# Patient Record
Sex: FEMALE | Race: ASIAN | Employment: OTHER | URBAN - METROPOLITAN AREA
[De-identification: names, ages, dates, MRNs, and addresses within clinical notes are randomized per-mention and may not be internally consistent; named-entity substitution may affect disease eponyms.]

---

## 2018-07-17 RX ORDER — SODIUM CHLORIDE, SODIUM LACTATE, POTASSIUM CHLORIDE, CALCIUM CHLORIDE 600; 310; 30; 20 MG/100ML; MG/100ML; MG/100ML; MG/100ML
INJECTION, SOLUTION INTRAVENOUS CONTINUOUS
Status: CANCELLED | OUTPATIENT
Start: 2018-07-17

## 2018-07-17 RX ORDER — CITRIC ACID/SODIUM CITRATE 334-500MG
30 SOLUTION, ORAL ORAL
Status: CANCELLED | OUTPATIENT
Start: 2018-07-17

## 2018-07-17 RX ORDER — CEFAZOLIN SODIUM 2 G/100ML
2 INJECTION, SOLUTION INTRAVENOUS
Status: CANCELLED | OUTPATIENT
Start: 2018-07-17

## 2018-07-17 RX ORDER — CEFAZOLIN SODIUM 1 G/3ML
1 INJECTION, POWDER, FOR SOLUTION INTRAMUSCULAR; INTRAVENOUS SEE ADMIN INSTRUCTIONS
Status: CANCELLED | OUTPATIENT
Start: 2018-07-17

## 2018-07-22 ENCOUNTER — ANESTHESIA EVENT (OUTPATIENT)
Dept: SURGERY | Facility: CLINIC | Age: 38
End: 2018-07-22
Payer: COMMERCIAL

## 2018-07-22 ENCOUNTER — HOSPITAL ENCOUNTER (INPATIENT)
Facility: CLINIC | Age: 38
LOS: 3 days | Discharge: HOME | End: 2018-07-25
Attending: OBSTETRICS & GYNECOLOGY | Admitting: OBSTETRICS & GYNECOLOGY
Payer: COMMERCIAL

## 2018-07-22 ENCOUNTER — ANESTHESIA (OUTPATIENT)
Dept: SURGERY | Facility: CLINIC | Age: 38
End: 2018-07-22
Payer: COMMERCIAL

## 2018-07-22 DIAGNOSIS — Z98.891 S/P REPEAT LOW TRANSVERSE C-SECTION: Primary | ICD-10-CM

## 2018-07-22 PROBLEM — Z36.89 ENCOUNTER FOR TRIAGE IN PREGNANT PATIENT: Status: ACTIVE | Noted: 2018-07-22

## 2018-07-22 LAB
ABO + RH BLD: NORMAL
ABO + RH BLD: NORMAL
BLD GP AB SCN SERPL QL: NORMAL
BLOOD BANK CMNT PATIENT-IMP: NORMAL
GLUCOSE BLDC GLUCOMTR-MCNC: 71 MG/DL (ref 70–99)
GLUCOSE BLDC GLUCOMTR-MCNC: 78 MG/DL (ref 70–99)
HGB BLD-MCNC: 12.4 G/DL (ref 11.7–15.7)
RUPTURE OF FETAL MEMBRANES BY ROM PLUS: POSITIVE
SPECIMEN EXP DATE BLD: NORMAL

## 2018-07-22 PROCEDURE — 84112 EVAL AMNIOTIC FLUID PROTEIN: CPT | Performed by: OBSTETRICS & GYNECOLOGY

## 2018-07-22 PROCEDURE — 27210794 ZZH OR GENERAL SUPPLY STERILE: Performed by: OBSTETRICS & GYNECOLOGY

## 2018-07-22 PROCEDURE — 00000146 ZZHCL STATISTIC GLUCOSE BY METER IP

## 2018-07-22 PROCEDURE — 86900 BLOOD TYPING SEROLOGIC ABO: CPT | Performed by: OBSTETRICS & GYNECOLOGY

## 2018-07-22 PROCEDURE — 86850 RBC ANTIBODY SCREEN: CPT | Performed by: OBSTETRICS & GYNECOLOGY

## 2018-07-22 PROCEDURE — 12000029 ZZH R&B OB INTERMEDIATE

## 2018-07-22 PROCEDURE — 37000009 ZZH ANESTHESIA TECHNICAL FEE, EACH ADDTL 15 MIN: Performed by: OBSTETRICS & GYNECOLOGY

## 2018-07-22 PROCEDURE — 25000128 H RX IP 250 OP 636: Performed by: NURSE ANESTHETIST, CERTIFIED REGISTERED

## 2018-07-22 PROCEDURE — 25000125 ZZHC RX 250: Performed by: NURSE ANESTHETIST, CERTIFIED REGISTERED

## 2018-07-22 PROCEDURE — 25000132 ZZH RX MED GY IP 250 OP 250 PS 637: Performed by: OBSTETRICS & GYNECOLOGY

## 2018-07-22 PROCEDURE — 37000008 ZZH ANESTHESIA TECHNICAL FEE, 1ST 30 MIN: Performed by: OBSTETRICS & GYNECOLOGY

## 2018-07-22 PROCEDURE — 25000128 H RX IP 250 OP 636: Performed by: OBSTETRICS & GYNECOLOGY

## 2018-07-22 PROCEDURE — 85018 HEMOGLOBIN: CPT | Performed by: OBSTETRICS & GYNECOLOGY

## 2018-07-22 PROCEDURE — 25000125 ZZHC RX 250: Performed by: OBSTETRICS & GYNECOLOGY

## 2018-07-22 PROCEDURE — 71000014 ZZH RECOVERY PHASE 1 LEVEL 2 FIRST HR: Performed by: OBSTETRICS & GYNECOLOGY

## 2018-07-22 PROCEDURE — 36000058 ZZH SURGERY LEVEL 3 EA 15 ADDTL MIN: Performed by: OBSTETRICS & GYNECOLOGY

## 2018-07-22 PROCEDURE — 27210995 ZZH RX 272: Performed by: OBSTETRICS & GYNECOLOGY

## 2018-07-22 PROCEDURE — 36415 COLL VENOUS BLD VENIPUNCTURE: CPT | Performed by: OBSTETRICS & GYNECOLOGY

## 2018-07-22 PROCEDURE — C1765 ADHESION BARRIER: HCPCS | Performed by: OBSTETRICS & GYNECOLOGY

## 2018-07-22 PROCEDURE — 86901 BLOOD TYPING SEROLOGIC RH(D): CPT | Performed by: OBSTETRICS & GYNECOLOGY

## 2018-07-22 PROCEDURE — 86780 TREPONEMA PALLIDUM: CPT | Performed by: OBSTETRICS & GYNECOLOGY

## 2018-07-22 PROCEDURE — 36000056 ZZH SURGERY LEVEL 3 1ST 30 MIN: Performed by: OBSTETRICS & GYNECOLOGY

## 2018-07-22 RX ORDER — SIMETHICONE 80 MG
80 TABLET,CHEWABLE ORAL 4 TIMES DAILY PRN
Status: DISCONTINUED | OUTPATIENT
Start: 2018-07-22 | End: 2018-07-25 | Stop reason: HOSPADM

## 2018-07-22 RX ORDER — OXYTOCIN/0.9 % SODIUM CHLORIDE 30/500 ML
340 PLASTIC BAG, INJECTION (ML) INTRAVENOUS CONTINUOUS PRN
Status: DISCONTINUED | OUTPATIENT
Start: 2018-07-22 | End: 2018-07-25 | Stop reason: HOSPADM

## 2018-07-22 RX ORDER — ACETAMINOPHEN 325 MG/1
650 TABLET ORAL EVERY 4 HOURS PRN
Status: DISCONTINUED | OUTPATIENT
Start: 2018-07-25 | End: 2018-07-25 | Stop reason: HOSPADM

## 2018-07-22 RX ORDER — NALOXONE HYDROCHLORIDE 0.4 MG/ML
.1-.4 INJECTION, SOLUTION INTRAMUSCULAR; INTRAVENOUS; SUBCUTANEOUS
Status: DISCONTINUED | OUTPATIENT
Start: 2018-07-22 | End: 2018-07-25 | Stop reason: HOSPADM

## 2018-07-22 RX ORDER — KETOROLAC TROMETHAMINE 30 MG/ML
30 INJECTION, SOLUTION INTRAMUSCULAR; INTRAVENOUS EVERY 6 HOURS
Status: DISPENSED | OUTPATIENT
Start: 2018-07-22 | End: 2018-07-23

## 2018-07-22 RX ORDER — SODIUM CHLORIDE, SODIUM LACTATE, POTASSIUM CHLORIDE, CALCIUM CHLORIDE 600; 310; 30; 20 MG/100ML; MG/100ML; MG/100ML; MG/100ML
INJECTION, SOLUTION INTRAVENOUS CONTINUOUS PRN
Status: DISCONTINUED | OUTPATIENT
Start: 2018-07-22 | End: 2018-07-22

## 2018-07-22 RX ORDER — IBUPROFEN 400 MG/1
400 TABLET, FILM COATED ORAL EVERY 6 HOURS PRN
Status: DISCONTINUED | OUTPATIENT
Start: 2018-07-23 | End: 2018-07-23

## 2018-07-22 RX ORDER — OXYTOCIN 10 [USP'U]/ML
10 INJECTION, SOLUTION INTRAMUSCULAR; INTRAVENOUS
Status: DISCONTINUED | OUTPATIENT
Start: 2018-07-22 | End: 2018-07-25 | Stop reason: HOSPADM

## 2018-07-22 RX ORDER — CITRIC ACID/SODIUM CITRATE 334-500MG
30 SOLUTION, ORAL ORAL
Status: COMPLETED | OUTPATIENT
Start: 2018-07-22 | End: 2018-07-22

## 2018-07-22 RX ORDER — CITRIC ACID/SODIUM CITRATE 334-500MG
SOLUTION, ORAL ORAL
Status: DISCONTINUED
Start: 2018-07-22 | End: 2018-07-22 | Stop reason: WASHOUT

## 2018-07-22 RX ORDER — SODIUM CHLORIDE, SODIUM LACTATE, POTASSIUM CHLORIDE, CALCIUM CHLORIDE 600; 310; 30; 20 MG/100ML; MG/100ML; MG/100ML; MG/100ML
INJECTION, SOLUTION INTRAVENOUS CONTINUOUS
Status: DISCONTINUED | OUTPATIENT
Start: 2018-07-22 | End: 2018-07-22 | Stop reason: HOSPADM

## 2018-07-22 RX ORDER — GLYCOPYRROLATE 0.2 MG/ML
INJECTION, SOLUTION INTRAMUSCULAR; INTRAVENOUS PRN
Status: DISCONTINUED | OUTPATIENT
Start: 2018-07-22 | End: 2018-07-22

## 2018-07-22 RX ORDER — OXYTOCIN/0.9 % SODIUM CHLORIDE 30/500 ML
100 PLASTIC BAG, INJECTION (ML) INTRAVENOUS CONTINUOUS
Status: DISCONTINUED | OUTPATIENT
Start: 2018-07-22 | End: 2018-07-25 | Stop reason: HOSPADM

## 2018-07-22 RX ORDER — KETOROLAC TROMETHAMINE 30 MG/ML
INJECTION, SOLUTION INTRAMUSCULAR; INTRAVENOUS PRN
Status: DISCONTINUED | OUTPATIENT
Start: 2018-07-22 | End: 2018-07-22

## 2018-07-22 RX ORDER — BUPIVACAINE HYDROCHLORIDE 5 MG/ML
INJECTION, SOLUTION EPIDURAL; INTRACAUDAL PRN
Status: DISCONTINUED | OUTPATIENT
Start: 2018-07-22 | End: 2018-07-22

## 2018-07-22 RX ORDER — CEFAZOLIN SODIUM 1 G/3ML
1 INJECTION, POWDER, FOR SOLUTION INTRAMUSCULAR; INTRAVENOUS SEE ADMIN INSTRUCTIONS
Status: DISCONTINUED | OUTPATIENT
Start: 2018-07-22 | End: 2018-07-22 | Stop reason: HOSPADM

## 2018-07-22 RX ORDER — AMOXICILLIN 250 MG
1 CAPSULE ORAL 2 TIMES DAILY PRN
Status: DISCONTINUED | OUTPATIENT
Start: 2018-07-22 | End: 2018-07-25 | Stop reason: HOSPADM

## 2018-07-22 RX ORDER — DIPHENHYDRAMINE HCL 25 MG
25 CAPSULE ORAL EVERY 6 HOURS PRN
Status: DISCONTINUED | OUTPATIENT
Start: 2018-07-22 | End: 2018-07-25 | Stop reason: HOSPADM

## 2018-07-22 RX ORDER — DEXTROSE, SODIUM CHLORIDE, SODIUM LACTATE, POTASSIUM CHLORIDE, AND CALCIUM CHLORIDE 5; .6; .31; .03; .02 G/100ML; G/100ML; G/100ML; G/100ML; G/100ML
INJECTION, SOLUTION INTRAVENOUS CONTINUOUS
Status: DISCONTINUED | OUTPATIENT
Start: 2018-07-22 | End: 2018-07-25 | Stop reason: HOSPADM

## 2018-07-22 RX ORDER — ONDANSETRON 2 MG/ML
INJECTION INTRAMUSCULAR; INTRAVENOUS PRN
Status: DISCONTINUED | OUTPATIENT
Start: 2018-07-22 | End: 2018-07-22

## 2018-07-22 RX ORDER — DEXTROSE MONOHYDRATE 25 G/50ML
25-50 INJECTION, SOLUTION INTRAVENOUS
Status: DISCONTINUED | OUTPATIENT
Start: 2018-07-22 | End: 2018-07-25 | Stop reason: HOSPADM

## 2018-07-22 RX ORDER — NALBUPHINE HYDROCHLORIDE 10 MG/ML
2.5-5 INJECTION, SOLUTION INTRAMUSCULAR; INTRAVENOUS; SUBCUTANEOUS EVERY 6 HOURS PRN
Status: DISCONTINUED | OUTPATIENT
Start: 2018-07-22 | End: 2018-07-22

## 2018-07-22 RX ORDER — AMOXICILLIN 250 MG
2 CAPSULE ORAL 2 TIMES DAILY PRN
Status: DISCONTINUED | OUTPATIENT
Start: 2018-07-22 | End: 2018-07-25 | Stop reason: HOSPADM

## 2018-07-22 RX ORDER — MORPHINE SULFATE 1 MG/ML
INJECTION, SOLUTION EPIDURAL; INTRATHECAL; INTRAVENOUS PRN
Status: DISCONTINUED | OUTPATIENT
Start: 2018-07-22 | End: 2018-07-22

## 2018-07-22 RX ORDER — OXYTOCIN/0.9 % SODIUM CHLORIDE 30/500 ML
PLASTIC BAG, INJECTION (ML) INTRAVENOUS PRN
Status: DISCONTINUED | OUTPATIENT
Start: 2018-07-22 | End: 2018-07-22

## 2018-07-22 RX ORDER — BISACODYL 10 MG
10 SUPPOSITORY, RECTAL RECTAL DAILY PRN
Status: DISCONTINUED | OUTPATIENT
Start: 2018-07-24 | End: 2018-07-25 | Stop reason: HOSPADM

## 2018-07-22 RX ORDER — HYDROCORTISONE 2.5 %
CREAM (GRAM) TOPICAL 3 TIMES DAILY PRN
Status: DISCONTINUED | OUTPATIENT
Start: 2018-07-22 | End: 2018-07-25 | Stop reason: HOSPADM

## 2018-07-22 RX ORDER — DEXAMETHASONE SODIUM PHOSPHATE 4 MG/ML
INJECTION, SOLUTION INTRA-ARTICULAR; INTRALESIONAL; INTRAMUSCULAR; INTRAVENOUS; SOFT TISSUE PRN
Status: DISCONTINUED | OUTPATIENT
Start: 2018-07-22 | End: 2018-07-22

## 2018-07-22 RX ORDER — CEFAZOLIN SODIUM 2 G/100ML
2 INJECTION, SOLUTION INTRAVENOUS
Status: DISCONTINUED | OUTPATIENT
Start: 2018-07-22 | End: 2018-07-22 | Stop reason: HOSPADM

## 2018-07-22 RX ORDER — CEFAZOLIN SODIUM 1 G/3ML
INJECTION, POWDER, FOR SOLUTION INTRAMUSCULAR; INTRAVENOUS PRN
Status: DISCONTINUED | OUTPATIENT
Start: 2018-07-22 | End: 2018-07-22

## 2018-07-22 RX ORDER — EPHEDRINE SULFATE 50 MG/ML
5 INJECTION, SOLUTION INTRAMUSCULAR; INTRAVENOUS; SUBCUTANEOUS
Status: DISCONTINUED | OUTPATIENT
Start: 2018-07-22 | End: 2018-07-22

## 2018-07-22 RX ORDER — IBUPROFEN 600 MG/1
600 TABLET, FILM COATED ORAL EVERY 6 HOURS PRN
Status: DISCONTINUED | OUTPATIENT
Start: 2018-07-23 | End: 2018-07-23

## 2018-07-22 RX ORDER — FENTANYL CITRATE 50 UG/ML
INJECTION, SOLUTION INTRAMUSCULAR; INTRAVENOUS PRN
Status: DISCONTINUED | OUTPATIENT
Start: 2018-07-22 | End: 2018-07-22

## 2018-07-22 RX ORDER — DIPHENHYDRAMINE HYDROCHLORIDE 50 MG/ML
25 INJECTION INTRAMUSCULAR; INTRAVENOUS EVERY 6 HOURS PRN
Status: DISCONTINUED | OUTPATIENT
Start: 2018-07-22 | End: 2018-07-25 | Stop reason: HOSPADM

## 2018-07-22 RX ORDER — IBUPROFEN 800 MG/1
800 TABLET, FILM COATED ORAL EVERY 6 HOURS PRN
Status: DISCONTINUED | OUTPATIENT
Start: 2018-07-23 | End: 2018-07-23

## 2018-07-22 RX ORDER — ONDANSETRON 2 MG/ML
4 INJECTION INTRAMUSCULAR; INTRAVENOUS EVERY 6 HOURS PRN
Status: DISCONTINUED | OUTPATIENT
Start: 2018-07-22 | End: 2018-07-22

## 2018-07-22 RX ORDER — NICOTINE POLACRILEX 4 MG
15-30 LOZENGE BUCCAL
Status: DISCONTINUED | OUTPATIENT
Start: 2018-07-22 | End: 2018-07-25 | Stop reason: HOSPADM

## 2018-07-22 RX ORDER — MAGNESIUM HYDROXIDE 1200 MG/15ML
LIQUID ORAL PRN
Status: DISCONTINUED | OUTPATIENT
Start: 2018-07-22 | End: 2018-07-25 | Stop reason: HOSPADM

## 2018-07-22 RX ORDER — OXYCODONE HYDROCHLORIDE 5 MG/1
5-10 TABLET ORAL
Status: DISCONTINUED | OUTPATIENT
Start: 2018-07-22 | End: 2018-07-25 | Stop reason: HOSPADM

## 2018-07-22 RX ORDER — LIDOCAINE 40 MG/G
CREAM TOPICAL
Status: DISCONTINUED | OUTPATIENT
Start: 2018-07-22 | End: 2018-07-25 | Stop reason: HOSPADM

## 2018-07-22 RX ORDER — PRENATAL VIT/IRON FUM/FOLIC AC 27MG-0.8MG
1 TABLET ORAL DAILY
COMMUNITY

## 2018-07-22 RX ORDER — ONDANSETRON 2 MG/ML
4 INJECTION INTRAMUSCULAR; INTRAVENOUS EVERY 6 HOURS PRN
Status: DISCONTINUED | OUTPATIENT
Start: 2018-07-22 | End: 2018-07-25 | Stop reason: HOSPADM

## 2018-07-22 RX ORDER — MISOPROSTOL 200 UG/1
400 TABLET ORAL
Status: DISCONTINUED | OUTPATIENT
Start: 2018-07-22 | End: 2018-07-25 | Stop reason: HOSPADM

## 2018-07-22 RX ORDER — ACETAMINOPHEN 325 MG/1
975 TABLET ORAL EVERY 8 HOURS
Status: DISCONTINUED | OUTPATIENT
Start: 2018-07-22 | End: 2018-07-25 | Stop reason: HOSPADM

## 2018-07-22 RX ORDER — LANOLIN 100 %
OINTMENT (GRAM) TOPICAL
Status: DISCONTINUED | OUTPATIENT
Start: 2018-07-22 | End: 2018-07-25 | Stop reason: HOSPADM

## 2018-07-22 RX ADMIN — SODIUM CITRATE AND CITRIC ACID MONOHYDRATE 30 ML: 500; 334 SOLUTION ORAL at 12:06

## 2018-07-22 RX ADMIN — PHENYLEPHRINE HYDROCHLORIDE 200 MCG: 10 INJECTION, SOLUTION INTRAMUSCULAR; INTRAVENOUS; SUBCUTANEOUS at 13:58

## 2018-07-22 RX ADMIN — MIDAZOLAM 2 MG: 1 INJECTION INTRAMUSCULAR; INTRAVENOUS at 13:54

## 2018-07-22 RX ADMIN — FENTANYL CITRATE 10 MCG: 50 INJECTION, SOLUTION INTRAMUSCULAR; INTRAVENOUS at 13:58

## 2018-07-22 RX ADMIN — ONDANSETRON 4 MG: 2 INJECTION INTRAMUSCULAR; INTRAVENOUS at 13:58

## 2018-07-22 RX ADMIN — SODIUM CHLORIDE, POTASSIUM CHLORIDE, SODIUM LACTATE AND CALCIUM CHLORIDE: 600; 310; 30; 20 INJECTION, SOLUTION INTRAVENOUS at 13:50

## 2018-07-22 RX ADMIN — BUPIVACAINE HYDROCHLORIDE 13.5 MG: 5 INJECTION, SOLUTION EPIDURAL; INTRACAUDAL at 13:58

## 2018-07-22 RX ADMIN — SODIUM CHLORIDE, POTASSIUM CHLORIDE, SODIUM LACTATE AND CALCIUM CHLORIDE: 600; 310; 30; 20 INJECTION, SOLUTION INTRAVENOUS at 12:32

## 2018-07-22 RX ADMIN — GLYCOPYRROLATE 0.2 MG: 0.2 INJECTION, SOLUTION INTRAMUSCULAR; INTRAVENOUS at 13:58

## 2018-07-22 RX ADMIN — SODIUM CHLORIDE, SODIUM LACTATE, POTASSIUM CHLORIDE, CALCIUM CHLORIDE AND DEXTROSE MONOHYDRATE: 5; 600; 310; 30; 20 INJECTION, SOLUTION INTRAVENOUS at 21:17

## 2018-07-22 RX ADMIN — SODIUM CHLORIDE, POTASSIUM CHLORIDE, SODIUM LACTATE AND CALCIUM CHLORIDE 1000 ML: 600; 310; 30; 20 INJECTION, SOLUTION INTRAVENOUS at 11:30

## 2018-07-22 RX ADMIN — MORPHINE SULFATE 0.15 MG: 1 INJECTION, SOLUTION EPIDURAL; INTRATHECAL; INTRAVENOUS at 13:58

## 2018-07-22 RX ADMIN — OXYTOCIN-SODIUM CHLORIDE 0.9% IV SOLN 30 UNIT/500ML 1 ML: 30-0.9/5 SOLUTION at 14:20

## 2018-07-22 RX ADMIN — ACETAMINOPHEN 975 MG: 325 TABLET, FILM COATED ORAL at 17:15

## 2018-07-22 RX ADMIN — OXYTOCIN-SODIUM CHLORIDE 0.9% IV SOLN 30 UNIT/500ML 199 ML: 30-0.9/5 SOLUTION at 14:32

## 2018-07-22 RX ADMIN — KETOROLAC TROMETHAMINE 30 MG: 30 INJECTION, SOLUTION INTRAMUSCULAR at 14:31

## 2018-07-22 RX ADMIN — CEFAZOLIN 2 G: 1 INJECTION, POWDER, FOR SOLUTION INTRAMUSCULAR; INTRAVENOUS at 13:50

## 2018-07-22 RX ADMIN — KETOROLAC TROMETHAMINE 30 MG: 30 INJECTION, SOLUTION INTRAMUSCULAR at 20:26

## 2018-07-22 RX ADMIN — OXYTOCIN-SODIUM CHLORIDE 0.9% IV SOLN 30 UNIT/500ML 100 ML/HR: 30-0.9/5 SOLUTION at 17:10

## 2018-07-22 RX ADMIN — PHENYLEPHRINE HYDROCHLORIDE 100 MCG: 10 INJECTION, SOLUTION INTRAMUSCULAR; INTRAVENOUS; SUBCUTANEOUS at 14:13

## 2018-07-22 RX ADMIN — DEXAMETHASONE SODIUM PHOSPHATE 8 MG: 4 INJECTION, SOLUTION INTRA-ARTICULAR; INTRALESIONAL; INTRAMUSCULAR; INTRAVENOUS; SOFT TISSUE at 13:58

## 2018-07-22 NOTE — IP AVS SNAPSHOT
MRN:[id number]                      After Visit Summary   7/22/2018    [facility] DARIO Frey    MRN: [id number]           Thank you!     Thank you for choosing [facility] for your care. Our goal is always to provide you with excellent care. Hearing back from our patients is one way we can continue to improve our services. Please take a few minutes to complete the written survey that you may receive in the mail after you visit. If you would like to speak to someone directly about your visit please contact Patient Relations at [phone]. Thank you!          Patient Information     Date Of Birth          1980        Designated Caregiver       Most Recent Value    Caregiver    Will someone help with your care after discharge? yes    Name of designated caregiver Jagrutidafreddy    Phone number of caregiver at bedside    Caregiver address same      About your hospital stay     You were admitted on:  July 22, 2018 You last received care in the:  [facility]    You were discharged on:  July 25, 2018        Reason for your hospital stay       Maternity care                  Who to Call     For medical emergencies, please call 911.  For non-urgent questions about your medical care, please call your primary care provider or clinic, [phone]  For questions related to your surgery, please call your surgery clinic        Attending Provider     Provider Specialty    Rusty Doty MD OB/Gyn       Primary Care Provider Office Phone # Fax #    [facility] [phone]      After Care Instructions     Activity       Review discharge instructions            Diet       Resume previous diet            Discharge Instructions - Postpartum visit       Schedule postpartum visit with your provider and return to clinic in 6 weeks.                  Follow-up Appointments     Follow Up and recommended labs and tests       Needs FBS at her 6 week exam                  Further  instructions from your care team       Postop  Birth Instructions  Lactation [phone]    Activity       Do not lift more than 10 pounds for 6 weeks after surgery.  Ask family and friends for help when you need it.    No driving until you have stopped taking your pain medications (usually two weeks after surgery).    No heavy exercise or activity for 6 weeks.  Don't do anything that will put a strain on your surgery site.    Don't strain when using the toilet.  Your care team may prescribe a stool softener if you have problems with your bowel movements.     To care for your incision:       Keep the incision clean and dry.    Do not soak your incision in water. No swimming or hot tubs until it has fully healed. You may soak in the bathtub if the water level is below your incision.    Do not use peroxide, gel, cream, lotion, or ointment on your incision.    Adjust your clothes to avoid pressure on your surgery site (check the elastic in your underwear for example).     You may see a small amount of clear or pink drainage and this is normal.  Check with your health care provider:       If the drainage increases or has an odor.    If the incision reddens, you have swelling, or develop a rash.    If you have increased pain and the medicine we prescribed doesn't help.    If you have a fever above 100.4 F (38 C) with or without chills when placing thermometer under your tongue.   The area around your incision (surgery wound), will feel numb.  This is normal. The numbness should go away in less than a year.     Keep your hands clean:  Always wash your hands before touching your incision (surgery wound). This helps reduce your risk of infection. If your hands aren't dirty, you may use an alcohol hand-rub to clean your hands. Keep your nails clean and short.    Call your healthcare provider if you have any of these symptoms:       You soak a sanitary pad with blood within 1 hour, or you see blood clots larger than  "a golf ball.    Bleeding that lasts more than 6 weeks.    Vaginal discharge that smells bad.    Severe pain, cramping or tenderness in your lower belly area.    A need to urinate more frequently (use the toilet more often), more urgently (use the toilet very quickly), or it burns when you urinate.    Nausea and vomiting.    Redness, swelling or pain around a vein in your leg.    Problems breastfeeding or a red or painful area on your breast.    Chest pain and cough or are gasping for air.    Problems with coping with sadness, anxiety or depression. If you have concerns about hurting yourself or the baby, call your provider immediately.      You have questions or concerns after you return home.       Pending Results     No orders found from 2018 to 2018.            Statement of Approval     Ordered          18 0753  I have reviewed and agree with all the recommendations and orders detailed in this document.  EFFECTIVE NOW     Approved and electronically signed by:  Ida Marroquin MD             Admission Information     Date & Time Provider Department Dept. Phone    2018 Rusty Doty MD [facility] Post[facility] [phone]      Your Vitals Were     Blood Pressure Pulse Temperature Respirations Pulse Oximetry       110/71 65 97.8  F (36.6  C) (Oral) 16 100%       [employer] Information     [employer] lets you send messages to your doctor, view your test results, renew your prescriptions, schedule appointments and more. To sign up, go to [email or web address] . Click on \"Log in\" on the left side of the screen, which will take you to the Welcome page. Then click on \"Sign up Now\" on the right side of the page.     You will be asked to enter the access code listed below, as well as some personal information. Please follow the directions to create your username and password.     Your access code is: [email or web address]  Expires: 10/23/2018 10:38 AM     Your access code will  in 90 days. If you need help " or a new code, please call your [place] clinic or [phone].        Care EveryWhere ID     This is your Care EveryWhere ID. This could be used by other organizations to access your [place] medical records  [id number]        Equal Access to Services     GUERLINE FERMIN : Hadii aad ku hadhibhanu Pascual, walydiada luqadaha, qatannerta kaalmada saadia, calli gabe rebasommer pelletierkamlesh sandhu gudelia jennings. So [facility] [phone].    ATENCIÓN: Si habla español, tiene a sanchez disposición servicios gratuitos de asistencia lingüística. Llame al [phone].    We comply with applicable federal civil rights laws and [place] laws. We do not discriminate on the basis of race, color, national origin, age, disability, sex, sexual orientation, or gender identity.               Review of your medicines      START taking        Dose / Directions    ibuprofen 600 MG tablet   Commonly known as:  ADVIL/MOTRIN        Dose:  600 mg   Take 1 tablet (600 mg) by mouth every 6 hours as needed for other (carmping)   Quantity:  120 tablet   Refills:  0       oxyCODONE IR 5 MG tablet   Commonly known as:  ROXICODONE        Dose:  5 mg   Take 1 tablet (5 mg) by mouth every 3 hours as needed for other (pain control or improvement in physical function. Hold dose for analgesic side effects.)   Quantity:  14 tablet   Refills:  0       senna-docusate 8.6-50 MG per tablet   Commonly known as:  SENOKOT-S;PERICOLACE        Dose:  1 tablet   Take 1 tablet by mouth 2 times daily as needed for constipation   Quantity:  100 tablet   Refills:  0         CONTINUE these medicines which may have CHANGED, or have new prescriptions. If we are uncertain of the size of tablets/capsules you have at home, strength may be listed as something that might have changed.        Dose / Directions    acetaminophen 325 MG tablet   Commonly known as:  TYLENOL   This may have changed:    - medication strength  - how much to take  - when to take this  - reasons to take this        Dose:  650  mg   Take 2 tablets (650 mg) by mouth every 6 hours as needed for other (multimodal surgical pain management along with NSAIDS and opioid medication as indicated based on pain control and physical function.)   Quantity:  100 tablet   Refills:  0         CONTINUE these medicines which have NOT CHANGED        Dose / Directions    NO ACTIVE MEDICATIONS        Refills:  0       prenatal multivitamin plus iron 27-0.8 MG Tabs per tablet        Dose:  1 tablet   Take 1 tablet by mouth daily   Refills:  0         STOP taking     azithromycin 250 MG tablet   Commonly known as:  ZITHROMAX           guaiFENesin-codeine 100-10 MG/5ML Soln solution   Commonly known as:  ROBITUSSIN AC                Where to get your medicines      These medications were sent to [facility] [place], [place] - [address] [address]  [address] [address] [address]     Phone:  [phone]     ibuprofen 600 MG tablet    senna-docusate 8.6-50 MG per tablet         Some of these will need a paper prescription and others can be bought over the counter. Ask your nurse if you have questions.     Bring a paper prescription for each of these medications     acetaminophen 325 MG tablet    oxyCODONE IR 5 MG tablet                Protect others around you: Learn how to safely use, store and throw away your medicines at www.disposemymeds.org.        Information about OPIOIDS     PRESCRIPTION OPIOIDS: WHAT YOU NEED TO KNOW   We gave you an opioid (narcotic) pain medicine. It is important to manage your pain, but opioids are not always the best choice. You should first try all the other options your care team gave you. Take this medicine for as short a time (and as few doses) as possible.     These medicines have risks:    DO NOT drive when on new or higher doses of pain medicine. These medicines can affect your alertness and reaction times, and you could be arrested for driving under the influence (DUI). If you need to use opioids  long-term, talk to your care team about driving.    DO NOT operate heave machinery    DO NOT do any other dangerous activities while taking these medicines.     DO NOT drink any alcohol while taking these medicines.      If the opioid prescribed includes acetaminophen, DO NOT take with any other medicines that contain acetaminophen. Read all labels carefully. Look for the word  acetaminophen  or  Tylenol.  Ask your pharmacist if you have questions or are unsure.    You can get addicted to pain medicines, especially if you have a history of addiction (chemical, alcohol or substance dependence). Talk to your care team about ways to reduce this risk.    Store your pills in a secure place, locked if possible. We will not replace any lost or stolen medicine. If you don t finish your medicine, please throw away (dispose) as directed by your pharmacist. The [place] Pollution Control Agency has more information about safe disposal: https://www.pca.[facility].mn.us/living-green/managing-unwanted-medications.     All opioids tend to cause constipation. Drink plenty of water and eat foods that have a lot of fiber, such as fruits, vegetables, prune juice, apple juice and high-fiber cereal. Take a laxative (Miralax, milk of magnesia, Colace, Senna) if you don t move your bowels at least every other day.              Medication List: This is a list of all your medications and when to take them. Check marks below indicate your daily home schedule. Keep this list as a reference.      Medications           Morning Afternoon Evening Bedtime As Needed    acetaminophen 325 MG tablet   Commonly known as:  TYLENOL   Take 2 tablets (650 mg) by mouth every 6 hours as needed for other (multimodal surgical pain management along with NSAIDS and opioid medication as indicated based on pain control and physical function.)   Last time this was given:  975 mg on 7/24/2018 12:06 PM                                ibuprofen 600 MG tablet   Commonly  known as:  ADVIL/MOTRIN   Take 1 tablet (600 mg) by mouth every 6 hours as needed for other (carmping)   Last time this was given:  800 mg on 7/25/2018  5:09 AM                                NO ACTIVE MEDICATIONS                                oxyCODONE IR 5 MG tablet   Commonly known as:  ROXICODONE   Take 1 tablet (5 mg) by mouth every 3 hours as needed for other (pain control or improvement in physical function. Hold dose for analgesic side effects.)                                prenatal multivitamin plus iron 27-0.8 MG Tabs per tablet   Take 1 tablet by mouth daily                                senna-docusate 8.6-50 MG per tablet   Commonly known as:  SENOKOT-S;PERICOLACE   Take 1 tablet by mouth 2 times daily as needed for constipation   Last time this was given:  2 tablets on 7/25/2018  8:02 AM

## 2018-07-22 NOTE — PLAN OF CARE
Data: [facility]: 2018 10:46 AM.  Reason for maternal/fetal assessment is leaking vaginal fluid. Patient reports SROM at 0700.  Patient is a .  Prenatal record reviewed. Pregnancy  has been complicated by gestational diabetes, diet controlled.  Gestational Age 36w2d. VSS. Fetal movement present. Patient denies uterine contractions, vaginal bleeding, abdominal pain, pelvic pressure, nausea, vomiting, headache, visual disturbances, epigastric or URQ pain, significant edema. Support person is present.   Action: Verbal consent for EFM. Triage assessment completed. Bill of rights reviewed.  Response: Patient verbalized agreement with plan. Will contact Dr Rusty Doty with update and further orders.

## 2018-07-22 NOTE — BRIEF OP NOTE
[facility] Brief Operative Note    Pre-operative diagnosis: Previous    Post-operative diagnosis Previous  sectio; SROM at 36 wks     Procedure: Procedure(s):  Repeat  Section     choice anesthesia - Wound Class: II-Clean Contaminated   Surgeon(s): Surgeon(s) and Role:     * Rusty Doty MD - Primary   Estimated blood loss: 500 mL    Specimens:   ID Type Source Tests Collected by Time Destination   1 :  Cord blood Blood OR DOCUMENTATION ONLY Rusty Doty MD 2018  2:19 PM       Findings: 5lb 7oz male infant with Apgars 8,9. Cleft lip observed

## 2018-07-22 NOTE — PLAN OF CARE
Problem: Patient Care Overview  Goal: Plan of Care/Patient Progress Review  Outcome: No Change  Oriented to Pp unit. Call light with in reach.  pt already started  breastpumping at this time. Plans to get pt out of bed when both legs are able . Mom x 1 emesis after drinking water. Educated to start with ice chips first. Denies any discomfort at this time.

## 2018-07-22 NOTE — IP AVS SNAPSHOT
[facility]    [address]    [facility] [address]    Phone:  [phone]    Fax:  [phone]                                       After Visit Summary   7/22/2018    [facility] ADRIO Frey    MRN: [id number]           After Visit Summary Signature Page     I have received my discharge instructions, and my questions have been answered. I have discussed any challenges I see with this plan with the nurse or doctor.    ..........................................................................................................................................  Patient/Patient Representative Signature      ..........................................................................................................................................  Patient Representative Print Name and Relationship to Patient    ..................................................               ................................................  Date                                            Time    ..........................................................................................................................................  Reviewed by Signature/Title    ...................................................              ..............................................  Date                                                            Time

## 2018-07-22 NOTE — ANESTHESIA POSTPROCEDURE EVALUATION
Patient: [facility] DARIO Frey    Procedure(s):  Repeat  Section     choice anesthesia - Wound Class: II-Clean Contaminated    Diagnosis:Previous   Diagnosis Additional Information: Repeat C/S in labor  Dr. DARIO Doty    Anesthesia Type:  Spinal    Note:  Anesthesia Post Evaluation    Patient location during evaluation: Bedside  Patient participation: Able to participate in evaluation but full recovery from regional anesthesia has not yet ocurrred but is anticipated to occur within 48 hours  Level of consciousness: awake  Pain management: adequate  Airway patency: patent  Cardiovascular status: acceptable  Respiratory status: acceptable  Hydration status: acceptable  PONV: none             Last vitals:  Vitals:    18 1100   BP: 127/75   Temp: 97.8  F (36.6  C)         Electronically Signed By: Manny Raymundo MD  2018  2:53 PM

## 2018-07-22 NOTE — PROVIDER NOTIFICATION
07/22/18 1230   Provider Notification   Provider Name/Title Dr Doty   Method of Notification At Bedside   Request Evaluate in Person   reviewing strip and aware of irreg heartbeat of fhts.

## 2018-07-22 NOTE — PROVIDER NOTIFICATION
18 1411   Provider Notification   Provider Name/Title    Method of Notification At Bedside   Request Attend Delivery

## 2018-07-22 NOTE — OP NOTE
Procedure Date: 2018      PROCEDURE PERFORMED:  Repeat low transverse  section.      PREOPERATIVE DIAGNOSES:   1.  Previous  x2.   2.  Spontaneous rupture of membranes at 36 weeks' gestation.   3.  Gestational diabetes, diet-controlled.      POSTOPERATIVE DIAGNOSIS:     1.  Previous  x2.   2.  Spontaneous rupture of membranes at 36 weeks' gestation.   3.  Gestational diabetes, diet-controlled.      PROCEDURE:  Rusty Doty MD      ANESTHESIA:  Spinal.      ESTIMATED BLOOD LOSS:  500 mL.      FINDINGS:  A 5 pound 7 ounce male infant with Apgars 8 and 9.  A cleft lip is observed.      COMPLICATIONS:  None.      INDICATIONS:  The patient is an otherwise healthy 37-year-old  3, para 2-0-0-2, female who has had an uncomplicated pregnancy, apart from gestational diabetes managed by diet alone.  She is known to be advanced maternal age, but declined genetic screening or testing or level 2 ultrasound.  She was scheduled for repeat  at 39 weeks, but presents at 36 weeks with spontaneous rupture of membranes.      DESCRIPTION OF PROCEDURE:  The patient was taken to the operating room where spinal anesthesia was placed and found to be adequate.  IV Ancef was administered.  Pneumatic compression devices were applied.  She was prepped and draped in the usual sterile fashion.      A Pfannenstiel incision was made with the scalpel blade.  Bovie cautery was used to dissect through subcutaneous tissue and fascia.  The fascia was elevated off the rectus muscle walls with blunt dissection and Bovie cautery.  Once in the peritoneal cavity, there were significant adhesions between the peritoneum and the anterior uterine wall.  These were carefully dissected away.  A bladder blade was placed inferiorly.  A low transverse cervical incision was made and entry into the amniotic cavity revealed clear fluid.  The fetus was found it was in vertex presentation and fundal pressure was applied to  allow for delivery.  Delayed cord clamping was performed.  The nursery staff was in attendance and the infant was handed to the NICU staff for inspection.      The placenta was delivered and was grossly unremarkable.  Good uterine tone was achieved with IV Pitocin.      The uterus was closed with a running stitch of 0 Monocryl suture.  A second imbricating layer was placed and hemostasis was confirmed.  Both tubes and ovaries were unremarkable.      A sheet of Interceed was placed along the site of the previous scarring to prevent new scarring.  The fascia was closed with a stitch of 0 PDS suture.  The subcutaneous tissues were irrigated and found to be hemostatic.  The skin was closed with Insorb staples.  A Tegaderm dressing was applied.  All sponge, lap, needle and instrument counts were correct and the patient was taken to the recovery room in stable condition.         IRA LEGGETT MD             D: 2018   T: 2018   MT: CHASE      Name:     KEYON LINDSEY [facility]   MRN:      [id number]        Account:        [id number]   :      1980           Procedure Date: 2018      Document: [id number]

## 2018-07-22 NOTE — H&P
36 yo  at 36+2 who presents with SROM. She has had 2 previous  sections.    PMH ILL GDM diet controlled   Surg C/S x 2   Meds PNV   All NKDA    OBHx As above  SHx    Non-smoker       PE Well appearing   AFVSS   Cardio RRR   Resp CTA   Ab Gravid nontender   Ext 0 edema   [place] None   FHT 130s with moderate variability; PACs audible    ROM plus positive    A/P 36 yo  at 36+2 with  AROM. Plan repeat  section

## 2018-07-22 NOTE — PROVIDER NOTIFICATION
07/22/18 1657   Provider Notification   Provider Name/Title Dr GRAEME Doty   Method of Notification Electronic Page;Phone   Request Evaluate-Remote   Notification Reason Other   Dr Doty updated re:pulse 43-55, BP wnl, pt asymptomatic fundus wnl, minimal bleeding. Will continue to monitor closely and update as needed.

## 2018-07-22 NOTE — ANESTHESIA PREPROCEDURE EVALUATION
PAC NOTE:       ANESTHESIA PRE EVALUATION:  Anesthesia Evaluation     . Pt has had prior anesthetic.     No history of anesthetic complications          ROS/MED HX    ENT/Pulmonary:  - neg pulmonary ROS     Neurologic:  - neg neurologic ROS     Cardiovascular:  - neg cardiovascular ROS       METS/Exercise Tolerance:     Hematologic:  - neg hematologic  ROS       Musculoskeletal:  - neg musculoskeletal ROS       GI/Hepatic:  - neg GI/hepatic ROS       Renal/Genitourinary:  - ROS Renal section negative       Endo:     (+) Obesity, .      Psychiatric:  - neg psychiatric ROS       Infectious Disease:  - neg infectious disease ROS       Malignancy:      - no malignancy   Other: Comment: At 36+ weeks with ROM and previous C/S for urgent C/S   (+) Possibly pregnant                    Physical Exam  Normal systems: cardiovascular, pulmonary and dental    Airway   Mallampati: II  TM distance: >3 FB  Neck ROM: full    Dental     Cardiovascular   Rhythm and rate: regular and normal      Pulmonary    breath sounds clear to auscultation    Other findings: No lab results found.   No lab results found.                  Anesthesia Plan      History & Physical Review  History and physical reviewed and following examination; no interval change.    ASA Status:  2 emergent.    NPO Status:  > 4 hours    Plan for Spinal   PONV prophylaxis:  Ondansetron (or other 5HT-3) and Dexamethasone or Solumedrol       Postoperative Care  Postoperative pain management:  IV analgesics, Oral pain medications, Neuraxial analgesia and Multi-modal analgesia.      Consents  Anesthetic plan, risks, benefits and alternatives discussed with:  Patient.  Use of blood products discussed: Yes.   Use of blood products discussed with Patient.  Consented to blood products.  .                            .

## 2018-07-22 NOTE — PLAN OF CARE
Data: [facility] DARIO Frey transferred to 444 via cart at 1745. Baby transferred to NICU after delivery.  Action: Receiving unit notified of transfer: Yes. Patient and family notified of room change. Report given to ISRAEL Joseph at 1815. Belongings sent to receiving unit. Accompanied by Registered Nurse. Oriented patient to surroundings. Call light within reach. ID bands double-checked with receiving RN.  Response: Patient tolerated transfer and is stable.

## 2018-07-22 NOTE — ANESTHESIA CARE TRANSFER NOTE
Patient: [facility] DARIO Frey    Procedure(s):  Repeat  Section     choice anesthesia - Wound Class: II-Clean Contaminated    Diagnosis: Previous   Diagnosis Additional Information: No value filed.    Anesthesia Type:   Spinal     Note:  Airway :Room Air  Patient transferred to:Labor and Delivery  Comments: VSS.Handoff Report: Identifed the Patient, Identified the Reponsible Provider, Reviewed the pertinent medical history, Discussed the surgical course, Reviewed Intra-OP anesthesia mangement and issues during anesthesia, Set expectations for post-procedure period and Allowed opportunity for questions and acknowledgement of understanding      Vitals: (Last set prior to Anesthesia Care Transfer)    CRNA VITALS  2018 1411 - 2018 1448      2018             Pulse: 79    SpO2: 98 %                Electronically Signed By: DARREN Corrigan CRNA  2018  2:48 PM

## 2018-07-22 NOTE — PROVIDER NOTIFICATION
07/22/18 9416   Comments   Comments bedside report given to tamica sanchez rn.  cares handed over.

## 2018-07-22 NOTE — ANESTHESIA PROCEDURE NOTES
Peripheral nerve/Neuraxial procedure note : intrathecal  Pre-Procedure  Performed by PROSPER COCHRAN  Location: OR      Pre-Anesthestic Checklist: patient identified, IV checked, site marked, risks and benefits discussed, informed consent, monitors and equipment checked, pre-op evaluation, at physician/surgeon's request and post-op pain management    Timeout  Correct Patient: Yes   Correct Procedure: Yes   Correct Site: Yes   Correct Laterality: Yes   Correct Position: Yes   Site Marked: Yes   .   Procedure Documentation    .    Procedure:    Intrathecal.  Insertion Site:L3-4  (midline approach)      Patient Prep;mask, sterile gloves, povidone-iodine 7.5% surgical scrub.  .  Needle: Sprotte Spinal Needle (gauge): 24  Spinal/LP Needle Length (inches): 4 # of attempts: 1 and # of redirects:  Introducer used (17 gTN with brad) .       Assessment/Narrative  Paresthesias: No.  .  .  clear CSF fluid removed . Sensory Level: T4

## 2018-07-22 NOTE — PROVIDER NOTIFICATION
07/22/18 1125   Provider Notification   Provider Name/Title Dr Doty   Method of Notification Electronic Page;Phone   Notification Reason Patient Arrived;Status Update

## 2018-07-23 LAB
GLUCOSE BLDC GLUCOMTR-MCNC: 107 MG/DL (ref 70–99)
HGB BLD-MCNC: 12.1 G/DL (ref 11.7–15.7)
T PALLIDUM AB SER QL: NONREACTIVE

## 2018-07-23 PROCEDURE — 00000146 ZZHCL STATISTIC GLUCOSE BY METER IP

## 2018-07-23 PROCEDURE — 25000128 H RX IP 250 OP 636: Performed by: OBSTETRICS & GYNECOLOGY

## 2018-07-23 PROCEDURE — 25000132 ZZH RX MED GY IP 250 OP 250 PS 637: Performed by: OBSTETRICS & GYNECOLOGY

## 2018-07-23 PROCEDURE — 36415 COLL VENOUS BLD VENIPUNCTURE: CPT | Performed by: OBSTETRICS & GYNECOLOGY

## 2018-07-23 PROCEDURE — 85018 HEMOGLOBIN: CPT | Performed by: OBSTETRICS & GYNECOLOGY

## 2018-07-23 PROCEDURE — 12000029 ZZH R&B OB INTERMEDIATE

## 2018-07-23 PROCEDURE — 40000083 ZZH STATISTIC IP LACTATION SERVICES 1-15 MIN

## 2018-07-23 RX ORDER — IBUPROFEN 600 MG/1
600 TABLET, FILM COATED ORAL EVERY 6 HOURS PRN
Status: DISCONTINUED | OUTPATIENT
Start: 2018-07-23 | End: 2018-07-25 | Stop reason: HOSPADM

## 2018-07-23 RX ORDER — IBUPROFEN 400 MG/1
400 TABLET, FILM COATED ORAL EVERY 6 HOURS PRN
Status: DISCONTINUED | OUTPATIENT
Start: 2018-07-23 | End: 2018-07-25 | Stop reason: HOSPADM

## 2018-07-23 RX ORDER — IBUPROFEN 800 MG/1
800 TABLET, FILM COATED ORAL EVERY 6 HOURS PRN
Status: DISCONTINUED | OUTPATIENT
Start: 2018-07-23 | End: 2018-07-25 | Stop reason: HOSPADM

## 2018-07-23 RX ADMIN — ACETAMINOPHEN 975 MG: 325 TABLET, FILM COATED ORAL at 02:44

## 2018-07-23 RX ADMIN — KETOROLAC TROMETHAMINE 30 MG: 30 INJECTION, SOLUTION INTRAMUSCULAR at 02:44

## 2018-07-23 RX ADMIN — IBUPROFEN 800 MG: 800 TABLET ORAL at 21:47

## 2018-07-23 RX ADMIN — IBUPROFEN 800 MG: 800 TABLET ORAL at 15:19

## 2018-07-23 RX ADMIN — ACETAMINOPHEN 975 MG: 325 TABLET, FILM COATED ORAL at 18:55

## 2018-07-23 RX ADMIN — KETOROLAC TROMETHAMINE 30 MG: 30 INJECTION, SOLUTION INTRAMUSCULAR at 08:19

## 2018-07-23 RX ADMIN — ACETAMINOPHEN 975 MG: 325 TABLET, FILM COATED ORAL at 10:22

## 2018-07-23 RX ADMIN — SODIUM CHLORIDE, SODIUM LACTATE, POTASSIUM CHLORIDE, CALCIUM CHLORIDE AND DEXTROSE MONOHYDRATE: 5; 600; 310; 30; 20 INJECTION, SOLUTION INTRAVENOUS at 05:24

## 2018-07-23 RX ADMIN — SENNOSIDES AND DOCUSATE SODIUM 1 TABLET: 8.6; 5 TABLET ORAL at 21:47

## 2018-07-23 RX ADMIN — SODIUM CHLORIDE, POTASSIUM CHLORIDE, SODIUM LACTATE AND CALCIUM CHLORIDE 500 ML: 600; 310; 30; 20 INJECTION, SOLUTION INTRAVENOUS at 04:22

## 2018-07-23 NOTE — PLAN OF CARE
Problem: Postpartum ( Delivery) (Adult,Obstetrics,Pediatric)  Goal: Signs and Symptoms of Listed Potential Problems Will be Absent, Minimized or Managed (Postpartum)  Signs and symptoms of listed potential problems will be absent, minimized or managed by discharge/transition of care (reference Postpartum ( Delivery) (Adult,Obstetrics,Pediatric) CPG).   Outcome: No Change  Pt was up few times at shift and went down with assist of 1 by w/c to see her infant at nicu, keeps pumping every 3 hrs, only few drops seen, encouraged to continue, removed cárdenas around 235 pm today, output is 900, yellow, improved after bolus from night shift, informed pt about expectations, planning on taking shower later today, wants to rest, reviewed pain plan with pt and spouse, iv is out, infiltrated.

## 2018-07-23 NOTE — PROGRESS NOTES
#1 Postop Repeat  36 weeks.    Pt states doing well.   Pain well controlled  Nursing.    Afebrile VSS. .  Fundus firm,light flow,  Incision dry & intact.  Passing flatus.  Urine clear.  Ext: w/o edema or pain    Normal postop course.    Continue routine postop care.

## 2018-07-23 NOTE — PLAN OF CARE
Problem: Patient Care Overview  Goal: Plan of Care/Patient Progress Review  Outcome: Improving  Pt. VSS, HR continues to run low. Pain managed with scheduled tylenol and toradol. Tegaderm dressing intact with small serosanguinous drainage, without signs of infection. Urine output low, Dr. Doty notified and fluid bolus ordered, see note. Will continue to monitor. Pt. tolerating oral intake of fluids well, denies nausea. Pt. requiring assistance with personal cares. Pumping for infant in NICU.

## 2018-07-23 NOTE — LACTATION NOTE
JULIANNE to see patient.  Her baby is in the NICU.  She has been pumping but no results yet.  JULIANNE reassured her that it is normal and explained the importance of frequent stimulation.  She has also been shown HE and will occasionally use HE as well.  LC discussed benefits of HE and breast massage, assessed pump settings, and will follow up tomorrow.

## 2018-07-23 NOTE — LACTATION NOTE
This note was copied from a baby's chart.  As LC spoke with [facility] in NICU. She is wanting to provide breast milk and declines use of donor milk. We discussed pumping strategies. At this time she has infrequently pumped (reports 2-3x yesterday). Defined need for 8X/ day. Reviewed settings as not to have suction too high. Encouraged by [place] team to put to breast. I anticipate due to anomaly of both lip and palate that will not be successful with transferring milk. I  will encourage STS for [facility]. Collaboration with OT and bedside RN.Will continue to follow and support.

## 2018-07-23 NOTE — PROVIDER NOTIFICATION
07/23/18 0348   Provider Notification   Provider Name/Title Dr. Doty   Method of Notification Phone   Request Evaluate-Remote   Notification Reason Status Update     Dr. Doty notified of low urine output, 23 mL/hour over last 6 hours. Patient tolerating oral intake well, IV fluids are infusing. Order received for 500 mL fluid bolus of LR to be infused over 1 hour.

## 2018-07-24 PROCEDURE — 25000132 ZZH RX MED GY IP 250 OP 250 PS 637: Performed by: OBSTETRICS & GYNECOLOGY

## 2018-07-24 PROCEDURE — 12000027 ZZH R&B OB

## 2018-07-24 RX ADMIN — ACETAMINOPHEN 975 MG: 325 TABLET, FILM COATED ORAL at 12:06

## 2018-07-24 RX ADMIN — ACETAMINOPHEN 975 MG: 325 TABLET, FILM COATED ORAL at 02:56

## 2018-07-24 RX ADMIN — SENNOSIDES AND DOCUSATE SODIUM 2 TABLET: 8.6; 5 TABLET ORAL at 23:25

## 2018-07-24 RX ADMIN — IBUPROFEN 800 MG: 800 TABLET ORAL at 17:01

## 2018-07-24 RX ADMIN — SENNOSIDES AND DOCUSATE SODIUM 1 TABLET: 8.6; 5 TABLET ORAL at 07:37

## 2018-07-24 RX ADMIN — IBUPROFEN 800 MG: 800 TABLET ORAL at 23:25

## 2018-07-24 ASSESSMENT — ACTIVITIES OF DAILY LIVING (ADL)
TRANSFERRING: 0-->INDEPENDENT
TOILETING: 0-->INDEPENDENT
BATHING: 0-->INDEPENDENT
FALL_HISTORY_WITHIN_LAST_SIX_MONTHS: NO
SWALLOWING: 0-->SWALLOWS FOODS/LIQUIDS WITHOUT DIFFICULTY
RETIRED_COMMUNICATION: 0-->UNDERSTANDS/COMMUNICATES WITHOUT DIFFICULTY
RETIRED_EATING: 0-->INDEPENDENT
COGNITION: 0 - NO COGNITION ISSUES REPORTED
DRESS: 0-->INDEPENDENT
AMBULATION: 0-->INDEPENDENT

## 2018-07-24 NOTE — PLAN OF CARE
Problem: Patient Care Overview  Goal: Plan of Care/Patient Progress Review  Patient up ad mango, taking wheelchair to NICU to see infant. Utilizing breast pump with encouragement. Incision well-approximated with tegaderm in place. Voiding without difficulty. Pain controlled with use of oral pain medications.

## 2018-07-24 NOTE — PROGRESS NOTES
July 24, 2018      SUBJECTIVE: No acute overnight events.  Pain adequately controlled.  +Lochia light.  Tolerating PO.  Flatus +, no BM yet.  Ambulating/urinating w/o difficulty.  Denies CP/palp/SOB/LH.    OBJECTIVE: /53  Pulse 65  Temp 98  F (36.7  C) (Oral)  Resp 18  SpO2 100%  Breastfeeding? Unknown  Gen: NAD, A&O x3  Abd: soft.  Incision C/D/I, no active bleeding.  No erythema, induration, or discharge, tegaderm on  Ext: mild edema BL LEs, symmetric, no CT    Hemoglobin   Date Value Ref Range Status   07/23/2018 12.1 11.7 - 15.7 g/dL Final   07/22/2018 12.4 11.7 - 15.7 g/dL Final   ]    A/P: POD#2 s/p rLTCS.  Doing well.  Afebrile, VSS.  - ibuprofen/oxycodone/tylenol PRN pain  - regular diet as tolerated  - breastfeeding  - encouraged ambulation  - routine post-op care        SONAL BURNETT MD

## 2018-07-24 NOTE — PLAN OF CARE
Problem: Patient Care Overview  Goal: Plan of Care/Patient Progress Review  Outcome: Improving  Stable patient, up independently and is voiding without difficulty. VSS, fundus firm, pain well managed with Tylenol and Ibuprofen. Pt encouraged to continue with pumping, she is also hand expressing. Incision site is dry and intact.  Spouse is present and supportive, both mom and dad are attentive to infant when visiting NICU, bonding well.

## 2018-07-24 NOTE — CONSULTS
Note copied from baby's chart  D) SW responding to MD consult. Met with baby Mendez Uribe's parents who reside in [place]. The couple now have 3 sons Hugo 15, Byron 9 and  Mendez Uribe who is in the NICU due to cleft palate and he was born at 36.2 weeks. The couple are prepared for him at home and are not on WIC. Ana has not completed her EPDS at this time but has no history of or concerns for baby blues/postpartum depression for her self. Ana's mother is living with them for 6 months from [facility] to help the family. MOB is a stay at home mom and FOB has 2 weeks off work and is also able to work from home.  I) SW explained role and provided supportive listening. SW gave information on baby blues/ postpartum depression as well as Community resources. SW also discussed NICU experience and gave NICU welcome card.  A)Ana is A&O with appropriate affect and eye contact her spouse is at bedside and supportive. They are both happy now that they have a plan for baby and feel he will be fine. The couple are feeling supported here and at home.  P) SW will continue to follow Mendez Uribe and his family while he is in the NICU.

## 2018-07-24 NOTE — PLAN OF CARE
Problem: Patient Care Overview  Goal: Plan of Care/Patient Progress Review  Outcome: Improving  Pt up ambulating independently. Voiding without difficulty. Incision approximated with Tegaderm dressing . Reports adequate pain control with current pain plan. Family present and supportive. Meeting expected goals. Mother attentive to infants needs visiting in NICU. Pumping and hand expression

## 2018-07-25 ENCOUNTER — LACTATION ENCOUNTER (OUTPATIENT)
Age: 38
End: 2018-07-25

## 2018-07-25 VITALS
SYSTOLIC BLOOD PRESSURE: 110 MMHG | DIASTOLIC BLOOD PRESSURE: 71 MMHG | HEART RATE: 65 BPM | TEMPERATURE: 97.8 F | RESPIRATION RATE: 16 BRPM | OXYGEN SATURATION: 100 %

## 2018-07-25 PROCEDURE — 40000083 ZZH STATISTIC IP LACTATION SERVICES 1-15 MIN

## 2018-07-25 PROCEDURE — 25000132 ZZH RX MED GY IP 250 OP 250 PS 637: Performed by: OBSTETRICS & GYNECOLOGY

## 2018-07-25 RX ORDER — ACETAMINOPHEN 325 MG/1
650 TABLET ORAL EVERY 6 HOURS PRN
Qty: 100 TABLET | Refills: 0 | Status: SHIPPED | OUTPATIENT
Start: 2018-07-25

## 2018-07-25 RX ORDER — AMOXICILLIN 250 MG
1 CAPSULE ORAL 2 TIMES DAILY PRN
Qty: 100 TABLET | Refills: 0 | Status: SHIPPED | OUTPATIENT
Start: 2018-07-25

## 2018-07-25 RX ORDER — OXYCODONE HYDROCHLORIDE 5 MG/1
5 TABLET ORAL
Qty: 14 TABLET | Refills: 0 | Status: SHIPPED | OUTPATIENT
Start: 2018-07-25

## 2018-07-25 RX ORDER — IBUPROFEN 600 MG/1
600 TABLET, FILM COATED ORAL EVERY 6 HOURS PRN
Qty: 120 TABLET | Refills: 0 | Status: SHIPPED | OUTPATIENT
Start: 2018-07-25

## 2018-07-25 RX ADMIN — SENNOSIDES AND DOCUSATE SODIUM 2 TABLET: 8.6; 5 TABLET ORAL at 08:02

## 2018-07-25 RX ADMIN — IBUPROFEN 800 MG: 800 TABLET ORAL at 05:09

## 2018-07-25 NOTE — DISCHARGE INSTRUCTIONS
Postop  Birth Instructions  Lactation [phone]    Activity       Do not lift more than 10 pounds for 6 weeks after surgery.  Ask family and friends for help when you need it.    No driving until you have stopped taking your pain medications (usually two weeks after surgery).    No heavy exercise or activity for 6 weeks.  Don't do anything that will put a strain on your surgery site.    Don't strain when using the toilet.  Your care team may prescribe a stool softener if you have problems with your bowel movements.     To care for your incision:       Keep the incision clean and dry.    Do not soak your incision in water. No swimming or hot tubs until it has fully healed. You may soak in the bathtub if the water level is below your incision.    Do not use peroxide, gel, cream, lotion, or ointment on your incision.    Adjust your clothes to avoid pressure on your surgery site (check the elastic in your underwear for example).     You may see a small amount of clear or pink drainage and this is normal.  Check with your health care provider:       If the drainage increases or has an odor.    If the incision reddens, you have swelling, or develop a rash.    If you have increased pain and the medicine we prescribed doesn't help.    If you have a fever above 100.4 F (38 C) with or without chills when placing thermometer under your tongue.   The area around your incision (surgery wound), will feel numb.  This is normal. The numbness should go away in less than a year.     Keep your hands clean:  Always wash your hands before touching your incision (surgery wound). This helps reduce your risk of infection. If your hands aren't dirty, you may use an alcohol hand-rub to clean your hands. Keep your nails clean and short.    Call your healthcare provider if you have any of these symptoms:       You soak a sanitary pad with blood within 1 hour, or you see blood clots larger than a golf ball.    Bleeding that lasts  more than 6 weeks.    Vaginal discharge that smells bad.    Severe pain, cramping or tenderness in your lower belly area.    A need to urinate more frequently (use the toilet more often), more urgently (use the toilet very quickly), or it burns when you urinate.    Nausea and vomiting.    Redness, swelling or pain around a vein in your leg.    Problems breastfeeding or a red or painful area on your breast.    Chest pain and cough or are gasping for air.    Problems with coping with sadness, anxiety or depression. If you have concerns about hurting yourself or the baby, call your provider immediately.      You have questions or concerns after you return home.

## 2018-07-25 NOTE — PROGRESS NOTES
Doing well. Is breast pumping. Baby is in NICU.    vss afeb  Abdomen soft and nt  Incision dry and intact with Tegaderm in place  Fundus firm and nt  Extremities nl    Hgb= 12.1  Glucose 71, 78, 107    A: POD 3 repeat LTCS doing well      Gestational diabetes diet controlled    P: discharge home       Precautions reviewed       RTC 6 will screen for diabetes at that visit

## 2018-07-25 NOTE — LACTATION NOTE
This note was copied from a baby's chart.  As LC assisting with breast feeding. Jojo is sleepy so placed STS to arouse then moved to breast. Attempt to latch but unable to suck and sustain latch. Introduced nipple shield with [facility]'s approval. Jojo latched and remained at breast with occasional sucking. Discussed strategies for him to be a breast even while NT feeding is infusing. Bedside ISRAEL Duval collaborated on plan. Will continue to follow and support.

## 2018-07-25 NOTE — LACTATION NOTE
This note was copied from a baby's chart.  As LC spoke with parents in the NICU. Reviewed ins co will not approve medical grade pump. Discussed option of renting medical grade pump as needed after milk is fully in. Reviewed pumping strategies, use and cleaning of the pump n style and provided brushes for cleaning.  [facility] and plans to go home. Plan to put babe to breast at 1pm. Will continue to follow and support.

## 2018-07-25 NOTE — PLAN OF CARE
Problem: Patient Care Overview  Goal: Plan of Care/Patient Progress Review  Outcome: Improving  VSS. Taking ibuprofen for discomfort. Encouraged patient to pump every 3 hours for infant in NICU. Incision WNL. Resting well tonight.

## 2018-07-27 ENCOUNTER — LACTATION ENCOUNTER (OUTPATIENT)
Age: 38
End: 2018-07-27

## 2018-07-27 NOTE — LACTATION NOTE
This note was copied from a baby's chart.  Assisting with breast feeding. Umar is alert and actively interested to latch at breast. Mother desires to attempt without nipple shield, but babe is frustrated. Introduced nipple shield. Improved hand placement for more breast to help seal at lip. Noted active sucking, more than this morning and occ swallow heard. Gtts of MOM given and encouraged active sucking. Encouraged breast compressions to aid in milk tranfer but difficult for Ana at this time. Continue to work on supporting babe and bringing him close to her during feeding rather than leaning over to feed. No milk transferred per scale. Ana brought in 60mL EBM. Will continue to follow and support.

## 2018-07-27 NOTE — LACTATION NOTE
This note was copied from a baby's chart.  As LC assisting with breast feeding. OT present and collaborating. Encouraged body support and hand positioning. Attempts to latch without shield not sustained. Difficult for Ana to support babe during breast feeding. Few initating sucks noted and then progressed to more appropriate sucking bursts. No milk was transferred per scale. Reviewed pumping strategies with Ana and stressed pumping every 3 hours. With OT, MD, myself and bedside RN we discussed breast feeding attempts ~3x/day for ~10-15 minutes followed by bottle feeding. Limiting time at breast to preserve his endurance for bottle feeding to get adequate nutrition. Parents acknowledged and agreed with this plan. Will continue to follow and support.

## 2018-07-29 NOTE — DISCHARGE SUMMARY
36 yo  who was admitted on2018 for SROM at 36+2 wks gestation. A repeat  was performed without complication. The infant was noted to have a cleft lip, which was unexpected    The patient's post op course was unremarkable. Her Hgb on post operative day 1 was 12.1g/dl. Her pain was manageable with oral medications. She had no signs of infection.    Due to feeding concerns, the infant was monitored in the NICU. The patient was discharged to home on post operative day 3.

## 2018-08-01 ENCOUNTER — LACTATION ENCOUNTER (OUTPATIENT)
Age: 38
End: 2018-08-01

## 2018-08-01 NOTE — LACTATION NOTE
This note was copied from a baby's chart.  As JULIANNE spoke with parents in the NICU. Spoke with parents in the NICU. Anticipating discharge tomorrow for Nate Joya is no longer offering the breast for fdg. We discussed her pumping strategies and recommendations were made for pumping schedule at home. We reviewed her pumping volume which is within target goals. I gave log sheets for monitoring volume as well as options for I phone apps. I gave her a binder to manage hands free pumping. I observed her pumping and changed breast shield to 21mm. After review of pump settings, I encouraged pumping until empty not by length of time. She will use a pump n style at home. I reviewed handouts for home storage of breast milk, thawing and warming milk, birth control, OTC and Rx medications. I gave them my card for further support in milk management after Jojo goes home.

## 2020-11-04 NOTE — PLAN OF CARE
[facility] Call Center    Phone Message    May a detailed message be left on voicemail: yes     Reason for Call: Other: Pt is requesting a sooner appt due to orginal appt being canceled due to scheduling error. Please call back regarding this     Action Taken: Other: [facility]    Travel Screening: Not Applicable                                                                       Problem: Patient Care Overview  Goal: Plan of Care/Patient Progress Review  Outcome: Adequate for Discharge Date Met: 07/25/18  Data: Vital signs within normal limits. Postpartum checks within normal limits - see flow record. Patient eating and drinking normally. Patient able to empty bladder independently and is up ambulating. No apparent signs of infection. Incision healing well. Patient performing self cares and is able to care for infant.  Action: Patient medicated during the shift for pain and cramping. See MAR. Patient reassessed within 1 hour after each medication and pain was improved - patient stated she was comfortable. Patient education done about discharge instructions, take home medications, post partum depression, and follow up appointments. Patient verbalized understanding of all discharge instructions and states she has no further questions/concerns.  Response: Visiting infant in NICU. Encouraged mother to continue pumping every 3 hours for infant. Support persons present.   Plan: Anticipate discharge home.

## 2020-12-14 ENCOUNTER — HEALTH MAINTENANCE LETTER (OUTPATIENT)
Age: 40
End: 2020-12-14

## 2021-10-02 ENCOUNTER — HEALTH MAINTENANCE LETTER (OUTPATIENT)
Age: 41
End: 2021-10-02

## 2022-01-11 NOTE — LACTATION NOTE
JULIANNE follow up.  She continues to pump.  She had many questions about how to obtain a pump for discharge.  JULIANNE also provided support and encouraged her to continue to place her infant STS frequently.  Ana may mimic breastfeeding if desired and approved by infant Pediatrician, but will continue to require pumping and supplementation due to infant cleft lip and palate.  All pumping questions answered.   Ftsg Text: The defect edges were debeveled with a #15 scalpel blade.  Given the location of the defect, shape of the defect and the proximity to free margins a full thickness skin graft was deemed most appropriate.  Using a sterile surgical marker, the primary defect shape was transferred to the donor site. The area thus outlined was incised deep to adipose tissue with a #15 scalpel blade.  The harvested graft was then trimmed of adipose tissue until only dermis and epidermis was left.  The skin margins of the secondary defect were undermined to an appropriate distance in all directions utilizing iris scissors.  The secondary defect was closed with interrupted buried subcutaneous sutures.  The skin edges were then re-apposed with running  sutures.  The skin graft was then placed in the primary defect and oriented appropriately.

## 2022-01-22 ENCOUNTER — HEALTH MAINTENANCE LETTER (OUTPATIENT)
Age: 42
End: 2022-01-22

## 2022-09-03 ENCOUNTER — HEALTH MAINTENANCE LETTER (OUTPATIENT)
Age: 42
End: 2022-09-03

## 2023-04-29 ENCOUNTER — HEALTH MAINTENANCE LETTER (OUTPATIENT)
Age: 43
End: 2023-04-29

## 2023-05-12 ENCOUNTER — OFFICE VISIT (OUTPATIENT)
Dept: FAMILY MEDICINE CLINIC | Facility: CLINIC | Age: 43
End: 2023-05-12
Payer: COMMERCIAL

## 2023-05-12 VITALS
RESPIRATION RATE: 18 BRPM | DIASTOLIC BLOOD PRESSURE: 82 MMHG | HEART RATE: 84 BPM | WEIGHT: 182 LBS | OXYGEN SATURATION: 98 % | BODY MASS INDEX: 32.65 KG/M2 | HEIGHT: 62.6 IN | TEMPERATURE: 98 F | SYSTOLIC BLOOD PRESSURE: 130 MMHG

## 2023-05-12 DIAGNOSIS — R05.3 CHRONIC COUGH: Primary | ICD-10-CM

## 2023-05-12 DIAGNOSIS — K80.20 CALCULUS OF GALLBLADDER WITHOUT CHOLECYSTITIS WITHOUT OBSTRUCTION: ICD-10-CM

## 2023-05-12 DIAGNOSIS — K21.9 GASTROESOPHAGEAL REFLUX DISEASE WITHOUT ESOPHAGITIS: ICD-10-CM

## 2023-05-12 PROCEDURE — 3008F BODY MASS INDEX DOCD: CPT | Performed by: FAMILY MEDICINE

## 2023-05-12 PROCEDURE — 3075F SYST BP GE 130 - 139MM HG: CPT | Performed by: FAMILY MEDICINE

## 2023-05-12 PROCEDURE — 3079F DIAST BP 80-89 MM HG: CPT | Performed by: FAMILY MEDICINE

## 2023-05-12 PROCEDURE — 99204 OFFICE O/P NEW MOD 45 MIN: CPT | Performed by: FAMILY MEDICINE

## 2023-08-16 ENCOUNTER — TELEPHONE (OUTPATIENT)
Dept: FAMILY MEDICINE CLINIC | Facility: CLINIC | Age: 43
End: 2023-08-16

## 2024-01-15 ENCOUNTER — PATIENT MESSAGE (OUTPATIENT)
Dept: FAMILY MEDICINE CLINIC | Facility: CLINIC | Age: 44
End: 2024-01-15

## 2024-01-15 NOTE — TELEPHONE ENCOUNTER
From: Angelica Okeefe  To: Codi Garcia  Sent: 1/15/2024 9:17 AM CST  Subject: Regarding appointment      Hi,   I am having physical appointment on Monday 22nd. I would like to take blood test before that. Can you send me message and details regarding that?   Thanks

## 2024-01-22 ENCOUNTER — OFFICE VISIT (OUTPATIENT)
Dept: FAMILY MEDICINE CLINIC | Facility: CLINIC | Age: 44
End: 2024-01-22
Payer: COMMERCIAL

## 2024-01-22 VITALS
SYSTOLIC BLOOD PRESSURE: 120 MMHG | RESPIRATION RATE: 18 BRPM | BODY MASS INDEX: 33.91 KG/M2 | WEIGHT: 189 LBS | TEMPERATURE: 97 F | DIASTOLIC BLOOD PRESSURE: 80 MMHG | OXYGEN SATURATION: 98 % | HEART RATE: 87 BPM | HEIGHT: 62.6 IN

## 2024-01-22 DIAGNOSIS — E55.9 VITAMIN D DEFICIENCY: ICD-10-CM

## 2024-01-22 DIAGNOSIS — E66.09 CLASS 1 OBESITY DUE TO EXCESS CALORIES WITHOUT SERIOUS COMORBIDITY WITH BODY MASS INDEX (BMI) OF 33.0 TO 33.9 IN ADULT: ICD-10-CM

## 2024-01-22 DIAGNOSIS — Z00.00 ROUTINE GENERAL MEDICAL EXAMINATION AT A HEALTH CARE FACILITY: Primary | ICD-10-CM

## 2024-01-22 DIAGNOSIS — Z12.4 SCREENING FOR CERVICAL CANCER: ICD-10-CM

## 2024-01-22 DIAGNOSIS — Z12.31 VISIT FOR SCREENING MAMMOGRAM: ICD-10-CM

## 2024-01-22 PROCEDURE — 88175 CYTOPATH C/V AUTO FLUID REDO: CPT | Performed by: FAMILY MEDICINE

## 2024-01-22 PROCEDURE — 87624 HPV HI-RISK TYP POOLED RSLT: CPT | Performed by: FAMILY MEDICINE

## 2024-01-22 NOTE — PROGRESS NOTES
[facility] Lilo Okeefe is a 43 year old female.  Chief Complaint   Patient presents with    Physical     HPI:   Angelica Okeefe is a 43 year old female with no significant past medical history seen for her annual physical and pap.  Menstrual cycle is regular and not heavy.  Does not do breast self exam, no family history of breast cancer, has never done a mammogram.    States eats more carbs, has not been exercising.    PAST MEDICAL HISTORY:   History reviewed. No pertinent past medical history.  PAST SURGICAL HISTORY:     Past Surgical History:   Procedure Laterality Date    HC  SECTION LEVEL I       ALLERGY:   Not on File  MEDICATIONS:     No current outpatient medications on file.     FAMILY HISTORY:   History reviewed. No pertinent family history.    SOCIAL HISTORY:     Social History     Socioeconomic History    Marital status:    Tobacco Use    Smoking status: Never    Smokeless tobacco: Never   Vaping Use    Vaping Use: Never used   Substance and Sexual Activity    Alcohol use: Never    Drug use: Never    Sexual activity: Never     REVIEW OF SYSTEMS:   Review of Systems   Constitutional:  Negative for appetite change, fatigue, fever and unexpected weight change.   HENT:  Negative for congestion, ear pain, hearing loss and sore throat.    Eyes:  Negative for discharge, redness and visual disturbance.   Respiratory:  Negative for cough, chest tightness and shortness of breath.    Cardiovascular:  Negative for chest pain and palpitations.   Gastrointestinal:  Negative for abdominal pain, blood in stool, constipation and nausea.   Endocrine: Negative for cold intolerance, heat intolerance and polyuria.   Genitourinary:  Negative for difficulty urinating, dysuria, frequency and urgency.   Musculoskeletal:  Negative for arthralgias, gait problem, joint swelling and myalgias.   Skin:  Negative for rash.   Allergic/Immunologic: Negative for food allergies.   Neurological:   Negative for dizziness, weakness, numbness and headaches.   Hematological:  Negative for adenopathy. Does not bruise/bleed easily.   Psychiatric/Behavioral:  Negative for dysphoric mood and sleep disturbance. The patient is not nervous/anxious.         PHYSICAL EXAM:   /80   Pulse 87   Temp 97 °F (36.1 °C) (Temporal)   Resp 18   Ht 5' 2.6\" (1.59 m)   Wt 189 lb (85.7 kg)   LMP 12/29/2023   SpO2 98%   BMI 33.91 kg/m²     Physical Exam  Constitutional:       General: She is not in acute distress.     Appearance: Normal appearance. She is well-developed. She is obese.   HENT:      Head: Normocephalic and atraumatic.      Right Ear: Tympanic membrane, ear canal and external ear normal.      Left Ear: Tympanic membrane, ear canal and external ear normal.      Nose: Nose normal.      Mouth/Throat:      Mouth: Mucous membranes are moist.      Pharynx: Oropharynx is clear.   Eyes:      Extraocular Movements: Extraocular movements intact.      Conjunctiva/sclera: Conjunctivae normal.      Pupils: Pupils are equal, round, and reactive to light.   Neck:      Thyroid: No thyromegaly.   Cardiovascular:      Rate and Rhythm: Normal rate and regular rhythm.      Heart sounds: Normal heart sounds. No murmur heard.  Pulmonary:      Effort: Pulmonary effort is normal. No respiratory distress.      Breath sounds: Normal breath sounds.   Chest:      Chest wall: No tenderness.   Breasts:     Right: Normal. No swelling, inverted nipple, mass, nipple discharge, skin change or tenderness.      Left: Normal. No swelling, inverted nipple, mass, nipple discharge, skin change or tenderness.   Abdominal:      General: Bowel sounds are normal. There is no distension.      Palpations: Abdomen is soft. There is no hepatomegaly, splenomegaly or mass.      Tenderness: There is no abdominal tenderness.      Hernia: No hernia is present. There is no hernia in the left inguinal area or right inguinal area.   Genitourinary:     General:  Normal vulva.      Exam position: Lithotomy position.      Labia:         Right: No rash or lesion.         Left: No rash or lesion.       Urethra: No urethral pain or urethral lesion.      Vagina: No vaginal discharge, erythema or lesions.      Cervix: No cervical motion tenderness, discharge, lesion or erythema.      Uterus: Not deviated, not enlarged, not fixed, not tender and no uterine prolapse.       Adnexa:         Right: No mass, tenderness or fullness.          Left: No mass, tenderness or fullness.        Rectum: Normal.   Musculoskeletal:         General: Normal range of motion.      Cervical back: Normal range of motion and neck supple.      Right lower leg: No edema.      Left lower leg: No edema.   Lymphadenopathy:      Cervical: No cervical adenopathy.      Upper Body:      Right upper body: No supraclavicular, axillary or pectoral adenopathy.      Left upper body: No supraclavicular, axillary or pectoral adenopathy.      Lower Body: No right inguinal adenopathy. No left inguinal adenopathy.   Skin:     General: Skin is warm.      Findings: No lesion or rash.   Neurological:      General: No focal deficit present.      Mental Status: She is alert and oriented to person, place, and time.      Deep Tendon Reflexes: Reflexes are normal and symmetric.   Psychiatric:         Mood and Affect: Mood normal.         Behavior: Behavior normal.         Thought Content: Thought content normal.         Judgment: Judgment normal.        ASSESSMENT AND PLAN:   Angelica was seen today for physical.    Diagnoses and all orders for this visit:    Routine general medical examination at a health care facility  -     CBC With Differential With Platelet  -     Assay, Thyroid Stim Hormone  -     Lipid Panel  -     Comp Metabolic Panel (14)    Visit for screening mammogram  -     3D Mammogram Digital Screen, Bilateral (CPT=77067/44714); Future    Screening for cervical cancer  -     ThinPrep PAP Smear; Future  -     Hpv Dna   High Risk , Thin Prep Collect; Future    Vitamin D deficiency  -     Vitamin D; Future    Class 1 obesity due to excess calories without serious comorbidity with body mass index (BMI) of 33.0 to 33.9 in adult         - encouraged healthy portion controlled diet and regular exercise.    Age appropriate anticipatory guidance reviewed  Health Maintenance   Topic Date Due    Annual Physical  Done today    Mammogram  Never done    COVID-19 Vaccine (1) Never done    Pap Smear  Done today    DTaP,Tdap,and Td Vaccines (1 - Tdap) 11/08/2018    Influenza Vaccine (1) 06/30/2024 (Originally 10/1/2023)    Annual Depression Screening  Completed    Pneumococcal Vaccine: Birth to 64yrs  Aged Out        The 21st Century Cures Act makes medical notes like these available to patients in the interest of transparency. Please be advised this is a medical document. Medical documents are intended to carry relevant information, facts as evident, and the clinical opinion of the practitioner. The medical note is intended as peer to peer communication and may appear blunt or direct. It is written in medical language and may contain abbreviations or verbiage that are unfamiliar.

## 2024-01-23 LAB — HPV I/H RISK 1 DNA SPEC QL NAA+PROBE: NEGATIVE

## 2024-01-25 LAB
.: NORMAL
.: NORMAL

## 2024-01-26 LAB
ABSOLUTE BASOPHILS: 28 CELLS/UL (ref 0–200)
ABSOLUTE EOSINOPHILS: 330 CELLS/UL (ref 15–500)
ABSOLUTE LYMPHOCYTES: 2129 CELLS/UL (ref 850–3900)
ABSOLUTE MONOCYTES: 402 CELLS/UL (ref 200–950)
ABSOLUTE NEUTROPHILS: 2613 CELLS/UL (ref 1500–7800)
ALBUMIN/GLOBULIN RATIO: 1.1 (CALC) (ref 1–2.5)
ALBUMIN: 4 G/DL (ref 3.6–5.1)
ALKALINE PHOSPHATASE: 120 U/L (ref 31–125)
ALT: 10 U/L (ref 6–29)
AST: 14 U/L (ref 10–30)
BASOPHILS: 0.5 %
BILIRUBIN, TOTAL: 0.6 MG/DL (ref 0.2–1.2)
BUN: 8 MG/DL (ref 7–25)
CALCIUM: 9.1 MG/DL (ref 8.6–10.2)
CARBON DIOXIDE: 24 MMOL/L (ref 20–32)
CHLORIDE: 99 MMOL/L (ref 98–110)
CHOL/HDLC RATIO: 5.6 (CALC)
CHOLESTEROL, TOTAL: 178 MG/DL
CREATININE: 0.66 MG/DL (ref 0.5–0.99)
EGFR: 112 ML/MIN/1.73M2
EOSINOPHILS: 6 %
GLOBULIN: 3.8 G/DL (CALC) (ref 1.9–3.7)
GLUCOSE: 81 MG/DL (ref 65–99)
HDL CHOLESTEROL: 32 MG/DL
HEMATOCRIT: 32.3 % (ref 35–45)
HEMOGLOBIN: 9.5 G/DL (ref 11.7–15.5)
LDL-CHOLESTEROL: 114 MG/DL (CALC)
LYMPHOCYTES: 38.7 %
MCH: 20.2 PG (ref 27–33)
MCHC: 29.4 G/DL (ref 32–36)
MCV: 68.6 FL (ref 80–100)
MONOCYTES: 7.3 %
MPV: 10.6 FL (ref 7.5–12.5)
NEUTROPHILS: 47.5 %
NON-HDL CHOLESTEROL: 146 MG/DL (CALC)
PLATELET COUNT: 401 THOUSAND/UL (ref 140–400)
POTASSIUM: 4.2 MMOL/L (ref 3.5–5.3)
PROTEIN, TOTAL: 7.8 G/DL (ref 6.1–8.1)
RDW: 15.7 % (ref 11–15)
RED BLOOD CELL COUNT: 4.71 MILLION/UL (ref 3.8–5.1)
SODIUM: 136 MMOL/L (ref 135–146)
TRIGLYCERIDES: 199 MG/DL
TSH: 14.46 MIU/L
VITAMIN D, 25-OH, TOTAL: 15 NG/ML (ref 30–100)
WHITE BLOOD CELL COUNT: 5.5 THOUSAND/UL (ref 3.8–10.8)

## 2024-02-07 ENCOUNTER — OFFICE VISIT (OUTPATIENT)
Dept: FAMILY MEDICINE CLINIC | Facility: CLINIC | Age: 44
End: 2024-02-07
Payer: COMMERCIAL

## 2024-02-07 VITALS
OXYGEN SATURATION: 98 % | HEIGHT: 62.6 IN | WEIGHT: 189 LBS | SYSTOLIC BLOOD PRESSURE: 118 MMHG | BODY MASS INDEX: 33.91 KG/M2 | DIASTOLIC BLOOD PRESSURE: 80 MMHG | HEART RATE: 78 BPM | TEMPERATURE: 98 F | RESPIRATION RATE: 18 BRPM

## 2024-02-07 DIAGNOSIS — J06.9 VIRAL UPPER RESPIRATORY TRACT INFECTION: ICD-10-CM

## 2024-02-07 DIAGNOSIS — E55.9 VITAMIN D DEFICIENCY: ICD-10-CM

## 2024-02-07 DIAGNOSIS — E78.2 MIXED HYPERLIPIDEMIA: ICD-10-CM

## 2024-02-07 DIAGNOSIS — R94.6 ABNORMAL THYROID FUNCTION TEST: Primary | ICD-10-CM

## 2024-02-07 DIAGNOSIS — D50.9 IRON DEFICIENCY ANEMIA, UNSPECIFIED IRON DEFICIENCY ANEMIA TYPE: ICD-10-CM

## 2024-02-07 PROCEDURE — 99213 OFFICE O/P EST LOW 20 MIN: CPT | Performed by: FAMILY MEDICINE

## 2024-02-07 RX ORDER — ERGOCALCIFEROL 1.25 MG/1
50000 CAPSULE ORAL WEEKLY
Qty: 12 CAPSULE | Refills: 0 | Status: SHIPPED | OUTPATIENT
Start: 2024-02-07 | End: 2024-02-07

## 2024-02-07 NOTE — PROGRESS NOTES
Angelica Okeefe is a 43 year old female.  Chief Complaint   Patient presents with    Lab Results     HPI:   Angelica Okeefe is a 43 year old female seen for follow up to discuss her lab results.    URI symptoms with runny nose and cough for the past 2 days, no fever, body aches or GI symptoms. Patient states every time she has a cold has cough and then gets wheezing and has to use her inhaler.    ALLERGY:   Not on File    MEDICATIONS:     No current outpatient medications on file.      History reviewed. No pertinent past medical history.   Social History:  Social History     Socioeconomic History    Marital status:    Tobacco Use    Smoking status: Never    Smokeless tobacco: Never   Vaping Use    Vaping Use: Never used   Substance and Sexual Activity    Alcohol use: Never    Drug use: Never    Sexual activity: Never        REVIEW OF SYSTEMS:   GENERAL HEALTH: feels well otherwise  SKIN: dryness  RESPIRATORY: denies shortness of breath with exertion  CARDIOVASCULAR: denies chest pain on exertion  GI: denies abdominal pain and denies heartburn  NEURO: denies headaches  GYN: normal menstrual flow, for 5 days    EXAM:   /80   Pulse 78   Temp 97.8 °F (36.6 °C) (Temporal)   Resp 18   Ht 5' 2.6\" (1.59 m)   Wt 189 lb (85.7 kg)   LMP 01/28/2024   SpO2 98%   BMI 33.91 kg/m²   GENERAL: obese,in no apparent distress  SKIN: xerosis  HEENT: atraumatic, normocephalic,nasal mucosa congested, ears and throat are clear  NECK: supple,no thyromegaly  LUNGS: clear to auscultation  CARDIO: RRR without murmur  EXTREMITIES: no cyanosis, clubbing or edema    Component      Latest Ref Rng 1/25/2024   WBC      3.8 - 10.8 Thousand/uL 5.5    RBC      3.80 - 5.10 Million/uL 4.71    Hemoglobin      11.7 - 15.5 g/dL 9.5 (L)    Hematocrit      35.0 - 45.0 % 32.3 (L)    MCV      80.0 - 100.0 fL 68.6 (L)    MCH      27.0 - 33.0 pg 20.2 (L)    MCHC      32.0 - 36.0 g/dL 29.4 (L)    RDW      11.0  - 15.0 % 15.7 (H)    Platelet Count      140 - 400 Thousand/uL 401 (H)    MPV      7.5 - 12.5 fL 10.6    Neutrophils Absolute      1,500 - 7,800 cells/uL 2,613    Lymphocytes Absolute      850 - 3,900 cells/uL 2,129    Monocytes Absolute      200 - 950 cells/uL 402    Eosinophils Absolute      15 - 500 cells/uL 330    Basophils Absolute      0 - 200 cells/uL 28    Neutrophils %      % 47.5    Lymphocytes %      % 38.7    Monocytes %      % 7.3    Eosinophils %      % 6.0    Basophils %      % 0.5    COMMENT(S) --    Glucose      65 - 99 mg/dL 81    BUN      7 - 25 mg/dL 8    CREATININE      0.50 - 0.99 mg/dL 0.66    EGFR      > OR = 60 mL/min/1.73m2 112    BUN/CREATININE RATIO      6 - 22 (calc) SEE NOTE:    Sodium      135 - 146 mmol/L 136    Potassium      3.5 - 5.3 mmol/L 4.2    Chloride      98 - 110 mmol/L 99    Carbon Dioxide, Total      20 - 32 mmol/L 24    CALCIUM      8.6 - 10.2 mg/dL 9.1    PROTEIN, TOTAL      6.1 - 8.1 g/dL 7.8    Albumin      3.6 - 5.1 g/dL 4.0    Globulin      1.9 - 3.7 g/dL (calc) 3.8 (H)    A/G Ratio      1.0 - 2.5 (calc) 1.1    Total Bilirubin      0.2 - 1.2 mg/dL 0.6    Alkaline Phosphatase      31 - 125 U/L 120    AST (SGOT)      10 - 30 U/L 14    ALT (SGPT)      6 - 29 U/L 10    Cholesterol, Total      <200 mg/dL 178    HDL Cholesterol      > OR = 50 mg/dL 32 (L)    Triglycerides      <150 mg/dL 199 (H)    LDL Cholesterol Calc      mg/dL (calc) 114 (H)    Chol/HDL Ratio      <5.0 (calc) 5.6 (H)    NON-HDL CHOLESTEROL      <130 mg/dL (calc) 146 (H)    TSH      mIU/L 14.46 (H)    VITAMIN D, 25-OH, TOTAL      30 - 100 ng/mL 15 (L)       Legend:  (L) Low  (H) High  ASSESSMENT AND PLAN:   [facility] was seen today for lab results.    Diagnoses and all orders for this visit:    Abnormal thyroid function test  -     Free T3 (Triiodothryronine); Future  -     Free T4, (Free Thyroxine); Future  -     Assay, Thyroid Stim Hormone; Future  -     Thyroid peroxidase & thyroglobulin ab; Future  -      Free T3 (Triiodothryronine)  -     Free T4, (Free Thyroxine)  -     Assay, Thyroid Stim Hormone  -     Thyroid peroxidase & thyroglobulin ab  -     recheck labs in 1 month and if abnormal will start medication    Mixed hyperlipidemia  -     LIPID PANEL [7600] [Q]; Future  -     LIPID PANEL [7600] [Q]  -     encouraged to limit saturated fats, cholesterol and refined sugars in diet  -     regular exercise    Vitamin D deficiency  -   patient will take over the counter vegetarian vitamin D tablets  -   advised 50,000IU once a week for 12 weeks and then 2000IU daily    Iron deficiency anemia, unspecified iron deficiency anemia type  -     CBC [6399] [Q]; Future  -     IRON AND TIBC [7573] [Q]; Future  -     FERRITIN [457] [Q]; Future  -     CBC [6399] [Q]  -     IRON AND TIBC [7573] [Q]  -     FERRITIN [457] [Q]  -     encouraged to increase iron rich foods in diet and recheck labs in 6 months    Viral upper respiratory tract infection        - comfort care discussed        -  increase fluid intake        - otc claritin, flonase nasal spray         The 21st Century Cures Act makes medical notes like these available to patients in the interest of transparency. Please be advised this is a medical document. Medical documents are intended to carry relevant information, facts as evident, and the clinical opinion of the practitioner. The medical note is intended as peer to peer communication and may appear blunt or direct. It is written in medical language and may contain abbreviations or verbiage that are unfamiliar.

## 2024-02-17 ENCOUNTER — HEALTH MAINTENANCE LETTER (OUTPATIENT)
Age: 44
End: 2024-02-17

## 2024-05-16 ENCOUNTER — TELEPHONE (OUTPATIENT)
Dept: INTERNAL MEDICINE CLINIC | Facility: CLINIC | Age: 44
End: 2024-05-16

## 2024-05-16 LAB
T3, FREE: 2.7 PG/ML (ref 2.3–4.2)
T4, FREE: 1 NG/DL (ref 0.8–1.8)
THYROGLOBULIN ANTIBODIES: <1 IU/ML
THYROID PEROXIDASE$ANTIBODIES: <1 IU/ML
TSH: 6.94 MIU/L

## 2024-05-16 NOTE — TELEPHONE ENCOUNTER
RN to pt call, pt accepted MD-offered appt.  Advised to arrive a few minutes early, pt and  verbalized understanding.    Future Appointments   Date Time Provider Department Center   5/22/2024  3:40 PM Cdoi Garcia MD [facility]

## 2024-05-16 NOTE — TELEPHONE ENCOUNTER
Called and spoke to pt. Pt was scheduling a f/u to sxs that have previously been discussed with Dr. Garcia. Per pt, she still has cough on/off, mostly occurs at night. Also will occasionally have \"stomach pains\", she stated both have previously been discussed, wanted to schedule f/u since still occurring intermittently.     No upcoming appts with Dr. Garcia. Would you see pt sooner? See partner?

## 2024-05-16 NOTE — TELEPHONE ENCOUNTER
Angelica Okeefe  P [facility] Office3 hours ago (10:49 AM)       Appointment For: Angelica Okeefe ([id number])  [facility]     7/9/2024    10:20 AM  40 mins.  Codi Garcia          [facility] [address]     Patient Comments:  Cough and stomach pain

## 2024-05-22 ENCOUNTER — OFFICE VISIT (OUTPATIENT)
Dept: FAMILY MEDICINE CLINIC | Facility: CLINIC | Age: 44
End: 2024-05-22

## 2024-05-22 VITALS
BODY MASS INDEX: 32.65 KG/M2 | RESPIRATION RATE: 18 BRPM | HEIGHT: 62.6 IN | DIASTOLIC BLOOD PRESSURE: 82 MMHG | HEART RATE: 86 BPM | OXYGEN SATURATION: 98 % | WEIGHT: 182 LBS | TEMPERATURE: 98 F | SYSTOLIC BLOOD PRESSURE: 120 MMHG

## 2024-05-22 DIAGNOSIS — R05.3 CHRONIC COUGH: Primary | ICD-10-CM

## 2024-05-22 DIAGNOSIS — R10.84 CHRONIC GENERALIZED ABDOMINAL PAIN: ICD-10-CM

## 2024-05-22 DIAGNOSIS — G89.29 CHRONIC GENERALIZED ABDOMINAL PAIN: ICD-10-CM

## 2024-05-22 DIAGNOSIS — E03.9 HYPOTHYROIDISM, UNSPECIFIED TYPE: ICD-10-CM

## 2024-05-22 PROCEDURE — 99214 OFFICE O/P EST MOD 30 MIN: CPT | Performed by: FAMILY MEDICINE

## 2024-05-22 RX ORDER — LEVOTHYROXINE SODIUM 0.03 MG/1
25 TABLET ORAL
Qty: 60 TABLET | Refills: 0 | Status: SHIPPED | OUTPATIENT
Start: 2024-05-22

## 2024-05-22 NOTE — PROGRESS NOTES
Angelica Okeefe is a 43 year old female.  Chief Complaint   Patient presents with    Abdominal Pain     HPI:   Angelica Okeefe is a 43 year old with history of chronic follow-up.  Patient is accompanied by .      Patient states abdominal pain started in 2019  started with cough, diagnosed has GERD and was given omeprazlzole 20 mg initially, patient states that did not help with her symptoms and the dose was increased to 40 mg, the cough better, states after she stopped the medication and sx came back.    Had ultrasound of gallbladder done for her abdominal pain and it did show a stone, referred to surgeon, states per surgeon was not an indication for surgery at the time.  Patient denies any right upper quadrant pain or any radiation of pain to the back, denies that her abdominal pain is associated open food intake.      Was referred to gastro as she continued pain, EGD done in 2019 was normal..    Patient denies any associated nausea, vomiting or diarrhea with no abdominal pain.  Denies being constipated.    Patient states sometimes using Flonase to help with the postnasal drainage, does get cough when she is exposed to cold air or dust, states using the inhaler at times does seem to help.    Would like to discuss her lab results    ALLERGY:   Not on File    MEDICATIONS:     No current outpatient medications on file.      History reviewed. No pertinent past medical history.   Social History:  Social History     Socioeconomic History    Marital status:    Tobacco Use    Smoking status: Never    Smokeless tobacco: Never   Vaping Use    Vaping status: Never Used   Substance and Sexual Activity    Alcohol use: Never    Drug use: Never    Sexual activity: Never        REVIEW OF SYSTEMS:   GENERAL HEALTH: feels well otherwise  SKIN: denies any unusual skin lesions or rashes  RESPIRATORY: denies shortness of breath with exertion  CARDIOVASCULAR: denies chest pain on  exertion  GI:  as documented in HPI  NEURO: denies headaches    EXAM:   /82   Pulse 86   Temp 98 °F (36.7 °C) (Temporal)   Resp 18   Ht 5' 2.6\" (1.59 m)   Wt 182 lb (82.6 kg)   LMP 05/17/2024   SpO2 98%   BMI 32.65 kg/m²   GENERAL: obese,in no apparent distress  HEENT: atraumatic, normocephalic, bilateral external canals and TMs appear normal, nasal mucosa is congested and swollen, no oropharyngeal erythema, +cobblestoning and postnasal drainage.  NECK: supple,no adenopathy  LUNGS: clear to auscultation, no wheezing, rhonchi or rales  CARDIO: RRR without murmur  ABDOMEN: soft, non tender, no HSM, normal BS.    ASSESSMENT AND PLAN:   Angelica was seen today for abdominal pain.    Diagnoses and all orders for this visit:    Chronic cough     - advised likely due to multiple reasons since in the past it did get better with omeprazole likely has GERD, advised if she has triggers like dust and cold could be allergic rhinitis and if she has not tried an antihistamine to do that daily along with the flonase nasal spray for 1 month. Can continue to use the inhaler as she might have reactive airway due to allergies.  If symptoms not     Chronic generalized abdominal pain  -     z [employer]ight CT ABDOMEN+PELVIS(CONTRAST ONLY)(CPT=74177); Future  -     z Insight CT ABDOMEN+PELVIS(CONTRAST ONLY)(CPT=74177)  -     H PYLORI BREATH TEST [89629][Q]  -     advised to get CT done and if imaging is normal and H.pylori is negative will refer to GI since her pain is not localized to the RUQ ( has cholelithiasis)  but is generalized    Hypothyroidism, unspecified type  -     levothyroxine 25 MCG Oral Tab; Take 1 tablet (25 mcg total) by mouth before breakfast.  -     TSH [899] [Q]  -     T4 FREE [866] [Q]  -     started on levothyroxine  -     advised to recheck labs in 6 weeks    Return if symptoms worsen or fail to improve.       The 21st Century Cures Act makes medical notes like these available to patients in the interest of  transparency. Please be advised this is a medical document. Medical documents are intended to carry relevant information, facts as evident, and the clinical opinion of the practitioner. The medical note is intended as peer to peer communication and may appear blunt or direct. It is written in medical language and may contain abbreviations or verbiage that are unfamiliar.

## 2024-05-22 NOTE — PATIENT INSTRUCTIONS
Please take over the counter claritin 10 mg daily and flonase nasal spray daily.  If cough is not better will refer to a pulmonologist.    If CT scan is normal and you still have abdominal pain will refer back to GI.

## 2025-04-07 ENCOUNTER — NURSE TRIAGE (OUTPATIENT)
Dept: FAMILY MEDICINE CLINIC | Facility: CLINIC | Age: 45
End: 2025-04-07

## 2025-04-07 NOTE — TELEPHONE ENCOUNTER
Action Requested: Summary for Provider     []  Critical Lab, Recommendations Needed  [] Need Additional Advice  [x]   FYI    []   Need Orders  [] Need Medications Sent to Pharmacy  []  Other     SUMMARY: ZULEYMA Lang Notified pt and spouse per protocol of OTC emergency contraceptive     Reason for call: No chief complaint on file.  Onset: Today   Reason for Disposition   Unprotected sexual intercourse within past 72 to 120 hours (3 to 5 days)    Protocols used: Contraception - Emergency-A-[place]    Received call from pt and spouse  Condom broke this AM and are looking for medication to avoid pregnancy  Pt not on any form of birth control   Notified per protocol, one time pill is available OTC and there is no need for prescription/order from doctor  Spouse and pt verbalize understanding

## (undated) DEVICE — CAP BABY PINK/BLUE IC-2

## (undated) DEVICE — PREP SKIN SCRUB TRAY 4461A

## (undated) DEVICE — SOL WATER IRRIG 1000ML BOTTLE 2F7114

## (undated) DEVICE — LINEN TOWEL PACK X10 5473

## (undated) DEVICE — SU PLAIN 0 FN-2 27" N864H

## (undated) DEVICE — BAG CLEAR TRASH 1.3M 39X33" P4040C

## (undated) DEVICE — SUCTION CANISTER MEDIVAC LINER 3000ML W/LID 65651-530

## (undated) DEVICE — LINEN FULL SHEET 5511

## (undated) DEVICE — DRSG TEGADERM 4X10" 1627

## (undated) DEVICE — GLOVE PROTEXIS POWDER FREE SMT 6.5  2D72PT65X

## (undated) DEVICE — LINEN DRAPE 54X72" 5467

## (undated) DEVICE — CATH TRAY FOLEY SURESTEP 16FR DRAIN BAG STATOCK A899916

## (undated) DEVICE — SOL NACL 0.9% IRRIG 1000ML BOTTLE 2F7124

## (undated) DEVICE — LINEN BABY BLANKET 5434

## (undated) DEVICE — GLOVE PROTEXIS BLUE W/NEU-THERA 6.5  2D73EB65

## (undated) DEVICE — STPL SKIN SUBCUTICULAR INSORB  2030

## (undated) DEVICE — PREP POVIDONE IODINE SOLUTION 10% 4OZ

## (undated) DEVICE — PREP CHLORAPREP 26ML TINTED ORANGE  260815

## (undated) DEVICE — GLOVE PROTEXIS MICRO 6.0  2D73PM60

## (undated) DEVICE — ESU GROUND PAD ADULT W/CORD E7507

## (undated) DEVICE — TRANSFER DEVICE BLOOD NDL HOLDER 364880

## (undated) DEVICE — PACK C-SECTION LF PL15OTA83B

## (undated) DEVICE — SU PDS II 0 CT 36" Z358T

## (undated) DEVICE — STOCKING SLEEVE VASOPRESS COMPRESSION CALF MED 18" VP501M

## (undated) DEVICE — SU MONOCRYL 0 CT-1 36" UND Y946H

## (undated) DEVICE — PAD CHUX UNDERPAD 30X36" P3036C

## (undated) DEVICE — BARRIER INTERCEED 3X4" 4350

## (undated) DEVICE — BLADE CLIPPER SGL USE 9680

## (undated) DEVICE — LINEN HALF SHEET 5512

## (undated) RX ORDER — FENTANYL CITRATE 50 UG/ML
INJECTION, SOLUTION INTRAMUSCULAR; INTRAVENOUS
Status: DISPENSED
Start: 2018-07-22

## (undated) RX ORDER — MORPHINE SULFATE 1 MG/ML
INJECTION, SOLUTION EPIDURAL; INTRATHECAL; INTRAVENOUS
Status: DISPENSED
Start: 2018-07-22

## (undated) RX ORDER — PHENYLEPHRINE HCL IN 0.9% NACL 1 MG/10 ML
SYRINGE (ML) INTRAVENOUS
Status: DISPENSED
Start: 2018-07-22

## (undated) RX ORDER — KETOROLAC TROMETHAMINE 30 MG/ML
INJECTION, SOLUTION INTRAMUSCULAR; INTRAVENOUS
Status: DISPENSED
Start: 2018-07-22

## (undated) RX ORDER — ONDANSETRON 2 MG/ML
INJECTION INTRAMUSCULAR; INTRAVENOUS
Status: DISPENSED
Start: 2018-07-22

## (undated) RX ORDER — DEXAMETHASONE SODIUM PHOSPHATE 4 MG/ML
INJECTION, SOLUTION INTRA-ARTICULAR; INTRALESIONAL; INTRAMUSCULAR; INTRAVENOUS; SOFT TISSUE
Status: DISPENSED
Start: 2018-07-22

## (undated) RX ORDER — OXYTOCIN/0.9 % SODIUM CHLORIDE 30/500 ML
PLASTIC BAG, INJECTION (ML) INTRAVENOUS
Status: DISPENSED
Start: 2018-07-22